# Patient Record
Sex: FEMALE | Race: OTHER | HISPANIC OR LATINO
[De-identification: names, ages, dates, MRNs, and addresses within clinical notes are randomized per-mention and may not be internally consistent; named-entity substitution may affect disease eponyms.]

---

## 2017-09-13 ENCOUNTER — RX RENEWAL (OUTPATIENT)
Age: 67
End: 2017-09-13

## 2017-12-11 ENCOUNTER — APPOINTMENT (OUTPATIENT)
Dept: NEUROLOGY | Facility: CLINIC | Age: 67
End: 2017-12-11
Payer: MEDICAID

## 2017-12-11 VITALS
SYSTOLIC BLOOD PRESSURE: 140 MMHG | DIASTOLIC BLOOD PRESSURE: 90 MMHG | HEIGHT: 60 IN | WEIGHT: 140 LBS | BODY MASS INDEX: 27.48 KG/M2

## 2017-12-11 PROCEDURE — 99214 OFFICE O/P EST MOD 30 MIN: CPT

## 2018-04-13 ENCOUNTER — MEDICATION RENEWAL (OUTPATIENT)
Age: 68
End: 2018-04-13

## 2018-08-02 ENCOUNTER — RX RENEWAL (OUTPATIENT)
Age: 68
End: 2018-08-02

## 2018-08-27 ENCOUNTER — APPOINTMENT (OUTPATIENT)
Dept: NEUROLOGY | Facility: CLINIC | Age: 68
End: 2018-08-27
Payer: MEDICAID

## 2018-08-27 VITALS
HEIGHT: 60 IN | DIASTOLIC BLOOD PRESSURE: 70 MMHG | BODY MASS INDEX: 31.41 KG/M2 | SYSTOLIC BLOOD PRESSURE: 150 MMHG | WEIGHT: 160 LBS

## 2018-08-27 PROCEDURE — 99214 OFFICE O/P EST MOD 30 MIN: CPT

## 2018-08-27 NOTE — ASSESSMENT
[FreeTextEntry1] : This is a 67-year-old woman with chronic headaches that have improved with nortriptyline.\par \par I will continue 10 mg at bedtime.\par \par She also has some mild cognitive impairment likely representing early Alzheimer's type dementia. I will continue Namenda at a dosage of 21 mg daily.\par \par Her blood pressure was slightly high today. I advised her to follow up with you for this.\par \par I will see her back in 6 months.

## 2018-08-27 NOTE — DATA REVIEWED
[de-identified] : Brain MRI was performed on 2/23/13. Study demonstrated only ischemic white matter change of a chronic nature. [de-identified] : EEG was normal. [de-identified] : B12 level was normal, thyroid function was normal, RPR was nonreactive.\par Sedimentation rate was 17.

## 2018-08-27 NOTE — CONSULT LETTER
[Dear  ___] : Dear  [unfilled], [Courtesy Letter:] : I had the pleasure of seeing your patient, [unfilled], in my office today. [Please see my note below.] : Please see my note below. [Consult Closing:] : Thank you very much for allowing me to participate in the care of this patient.  If you have any questions, please do not hesitate to contact me. [Sincerely,] : Sincerely, [FreeTextEntry3] : Richard Alexander MD.

## 2018-08-27 NOTE — PHYSICAL EXAM
[General Appearance - Alert] : alert [Oriented To Time, Place, And Person] : oriented to person, place, and time [Recall ___ / 3] : recall [unfilled] / 3 [Cranial Nerves Optic (II)] : visual acuity intact bilaterally,  visual fields full to confrontation, pupils equal round and reactive to light [Cranial Nerves Oculomotor (III)] : extraocular motion intact [Cranial Nerves Trigeminal (V)] : facial sensation intact symmetrically [Cranial Nerves Facial (VII)] : face symmetrical [Cranial Nerves Vestibulocochlear (VIII)] : hearing was intact bilaterally [Cranial Nerves Glossopharyngeal (IX)] : tongue and palate midline [Cranial Nerves Accessory (XI - Cranial And Spinal)] : head turning and shoulder shrug symmetric [Cranial Nerves Hypoglossal (XII)] : there was no tongue deviation with protrusion [Motor Tone] : muscle tone was normal in all four extremities [Motor Strength] : muscle strength was normal in all four extremities [Sensation Tactile Decrease] : light touch was intact [Sensation Vibration Decrease] : vibration was intact [Abnormal Walk] : normal gait [2+] : Patella left 2+ [FreeTextEntry1] : Short-term memory is impaired. Remote memory is relatively well preserved. [Aphasia] : no dysphasia/aphasia [Romberg's Sign] : Romberg's sign was negtive [Coordination - Dysmetria Impaired Finger-to-Nose Bilateral] : not present [Plantar Reflex Right Only] : normal on the right [Plantar Reflex Left Only] : normal on the left

## 2018-08-27 NOTE — HISTORY OF PRESENT ILLNESS
[FreeTextEntry1] : I saw this patient in the office today.\par \par As you recall she is a history of chronic headaches for many years. I had started her on nortriptyline. She had been on 10 mg as she had side effects on higher doses.\par Since starting nortriptyline she reports only occasional headaches.\par These are mild in intensity.\par They are not associated with nausea or photophobia.\par \par She also has some cognitive impairment likely representing early Alzheimer's type dementia. She had side effects on Aricept (palpitations). She has been on Namenda extended release 21 mg. She had side effects of 28 mg.\par \par

## 2018-12-26 ENCOUNTER — APPOINTMENT (OUTPATIENT)
Dept: NEUROLOGY | Facility: CLINIC | Age: 68
End: 2018-12-26
Payer: MEDICAID

## 2018-12-26 VITALS
HEIGHT: 60 IN | BODY MASS INDEX: 33.96 KG/M2 | DIASTOLIC BLOOD PRESSURE: 80 MMHG | SYSTOLIC BLOOD PRESSURE: 160 MMHG | WEIGHT: 173 LBS

## 2018-12-26 PROCEDURE — 99214 OFFICE O/P EST MOD 30 MIN: CPT

## 2018-12-26 NOTE — DATA REVIEWED
[de-identified] : Brain MRI was performed on 2/23/13. \par The study demonstrated only ischemic white matter change of a chronic nature. [de-identified] : EEG was normal. [de-identified] : B12 level was normal, thyroid function was normal, RPR was nonreactive.\par Sedimentation rate was 17.

## 2018-12-26 NOTE — ASSESSMENT
[FreeTextEntry1] : This is a 68-year-old woman with chronic headaches that have improved with nortriptyline.\par \par I will continue 10 mg at bedtime.\par \par She also has some mild cognitive impairment likely representing early Alzheimer's type dementia. I will continue Namenda at a dosage of 21 mg daily.\par \par She has a recent onset of tremors. I will obtain blood test for thyroid function.\par I will obtain a dopamine spectroscopy scan.\par \par I will see her back in 6 weeks.

## 2018-12-26 NOTE — PHYSICAL EXAM
[General Appearance - Alert] : alert [General Appearance - In No Acute Distress] : in no acute distress [Oriented To Time, Place, And Person] : oriented to person, place, and time [Recall ___ / 3] : recall [unfilled] / 3 [Cranial Nerves Optic (II)] : visual acuity intact bilaterally,  visual fields full to confrontation, pupils equal round and reactive to light [Cranial Nerves Oculomotor (III)] : extraocular motion intact [Cranial Nerves Trigeminal (V)] : facial sensation intact symmetrically [Cranial Nerves Facial (VII)] : face symmetrical [Cranial Nerves Vestibulocochlear (VIII)] : hearing was intact bilaterally [Cranial Nerves Glossopharyngeal (IX)] : tongue and palate midline [Cranial Nerves Accessory (XI - Cranial And Spinal)] : head turning and shoulder shrug symmetric [Cranial Nerves Hypoglossal (XII)] : there was no tongue deviation with protrusion [Motor Tone] : muscle tone was normal in all four extremities [Motor Strength] : muscle strength was normal in all four extremities [Sensation Tactile Decrease] : light touch was intact [Sensation Vibration Decrease] : vibration was intact [Abnormal Walk] : normal gait [Tremor] : a tremor present [2+] : Patella left 2+ [Optic Disc Abnormality] : the optic disc were normal in size and color [Edema] : there was no peripheral edema [Musculoskeletal - Swelling] : no joint swelling seen [FreeTextEntry1] : Short-term memory is impaired. Remote memory is relatively well preserved. [Dysarthria] : no dysarthria [Aphasia] : no dysphasia/aphasia [Romberg's Sign] : Romberg's sign was negtive [Coordination - Dysmetria Impaired Finger-to-Nose Bilateral] : not present [Plantar Reflex Right Only] : normal on the right [Plantar Reflex Left Only] : normal on the left

## 2018-12-26 NOTE — HISTORY OF PRESENT ILLNESS
[FreeTextEntry1] : I saw this patient in the office today.\par \par As you recall she is a history of chronic headaches for many years. I had started her on nortriptyline. She had been on 10 mg as she had side effects on higher doses.\par Since starting nortriptyline she reports only occasional headaches.\par These are mild in intensity.\par They are not associated with nausea or photophobia.\par \par She also has some cognitive impairment likely representing early Alzheimer's type dementia. \par She had side effects on Aricept (palpitations). She has been on Namenda extended release 21 mg. She had side effects of 28 mg.\par \par She now reports that for the past few months she has been having tremors of her head.\par This is with her on a daily basis.\par It waxes and wanes in intensity.\par She denies associated balance difficulty.

## 2019-01-28 ENCOUNTER — FORM ENCOUNTER (OUTPATIENT)
Age: 69
End: 2019-01-28

## 2019-01-29 ENCOUNTER — OUTPATIENT (OUTPATIENT)
Dept: OUTPATIENT SERVICES | Facility: HOSPITAL | Age: 69
LOS: 1 days | End: 2019-01-29

## 2019-01-29 ENCOUNTER — APPOINTMENT (OUTPATIENT)
Dept: NUCLEAR MEDICINE | Facility: CLINIC | Age: 69
End: 2019-01-29
Payer: MEDICAID

## 2019-01-29 DIAGNOSIS — R25.1 TREMOR, UNSPECIFIED: ICD-10-CM

## 2019-01-29 DIAGNOSIS — Z90.49 ACQUIRED ABSENCE OF OTHER SPECIFIED PARTS OF DIGESTIVE TRACT: Chronic | ICD-10-CM

## 2019-01-29 PROCEDURE — 78607: CPT | Mod: 26

## 2019-02-13 ENCOUNTER — APPOINTMENT (OUTPATIENT)
Dept: NEUROLOGY | Facility: CLINIC | Age: 69
End: 2019-02-13
Payer: MEDICAID

## 2019-02-13 VITALS
BODY MASS INDEX: 33.38 KG/M2 | SYSTOLIC BLOOD PRESSURE: 125 MMHG | WEIGHT: 170 LBS | DIASTOLIC BLOOD PRESSURE: 80 MMHG | HEIGHT: 60 IN

## 2019-02-13 PROCEDURE — 99214 OFFICE O/P EST MOD 30 MIN: CPT

## 2019-02-13 RX ORDER — ERGOCALCIFEROL 1.25 MG/1
1.25 MG CAPSULE, LIQUID FILLED ORAL
Qty: 4 | Refills: 0 | Status: COMPLETED | COMMUNITY
Start: 2018-12-19

## 2019-02-13 RX ORDER — ASPIRIN ENTERIC COATED TABLETS 81 MG 81 MG/1
81 TABLET, DELAYED RELEASE ORAL
Qty: 30 | Refills: 0 | Status: COMPLETED | COMMUNITY
Start: 2018-12-19

## 2019-02-13 NOTE — HISTORY OF PRESENT ILLNESS
[FreeTextEntry1] : I saw this patient in the office today.\par \par As you recall she is a history of chronic headaches for many years. I had started her on nortriptyline. She had been on 10 mg as she had side effects on higher doses.\par Since starting nortriptyline she reports only occasional headaches.\par These are mild in intensity.\par They are not associated with nausea or photophobia.\par \par She also has some cognitive impairment likely representing early Alzheimer's type dementia. \par She had side effects on Aricept (palpitations). She has been on Namenda extended release 21 mg. She had side effects of 28 mg.\par \par She now reports that for the past few months she has been having tremors of her head.\par This is with her on a daily basis.\par It waxes and wanes in intensity.\par She denies associated balance difficulty.\par \par

## 2019-02-13 NOTE — DATA REVIEWED
[de-identified] : Brain MRI was performed on 2/23/13. \par The study demonstrated only ischemic white matter change of a chronic nature. [de-identified] : EEG was normal. [de-identified] : Katya scan was normal.  [de-identified] : B12 level was normal, RPR was nonreactive.\par Sedimentation rate was 17.\par Thyroid function was normal.

## 2019-02-13 NOTE — ASSESSMENT
[FreeTextEntry1] : This is a 68 year-old woman with chronic headaches that have improved with nortriptyline.\par \par I will continue 10 mg at bedtime.\par \par She also has some mild cognitive impairment likely representing early Alzheimer's type dementia. I will continue Namenda at a dosage of 21 mg daily.\par \par She has a recent onset of tremors. \par She does not have findings of parkinsonism, and dopamine spectroscopy scan was normal.\par I will refer her to a movement specialist.\par \par I will see her back in 3 months.

## 2019-05-15 ENCOUNTER — APPOINTMENT (OUTPATIENT)
Dept: NEUROLOGY | Facility: CLINIC | Age: 69
End: 2019-05-15
Payer: MEDICAID

## 2019-05-15 VITALS
SYSTOLIC BLOOD PRESSURE: 160 MMHG | DIASTOLIC BLOOD PRESSURE: 80 MMHG | HEIGHT: 60 IN | WEIGHT: 170 LBS | BODY MASS INDEX: 33.38 KG/M2

## 2019-05-15 PROCEDURE — 99214 OFFICE O/P EST MOD 30 MIN: CPT

## 2019-05-15 NOTE — DATA REVIEWED
[de-identified] : Brain MRI was performed on 2/23/13. \par The study demonstrated only ischemic white matter change of a chronic nature. [de-identified] : EEG was normal. [de-identified] : Katya scan was normal.  [de-identified] : B12 level was normal, RPR was nonreactive.\par Sedimentation rate was 17.\par Thyroid function was normal.

## 2019-05-15 NOTE — ASSESSMENT
[FreeTextEntry1] : This is a 68 year-old woman with chronic headaches that have improved with nortriptyline.\par The headaches have increased again.\par I discussed the options of increasing nortriptyline as she has now been on the 10 mg dosage for quite a while and has likely acclimated.\par The alternative would be to switch her to another agent.\par Topiramate can often cause cognitive impairment and I would prefer to avoid this in this patient.\par I will increase nortriptyline to 25 mg at bedtime.\par \par She also has some mild cognitive impairment likely representing early Alzheimer's type dementia. I will continue Namenda at a dosage of 21 mg daily.\par \par She had a recent onset of tremors. \par These were not visible today.\par She does not have findings of parkinsonism, and dopamine spectroscopy scan was normal.\par I had referred her to a movement specialist.\par \par I will see her back in 3 months.

## 2019-05-15 NOTE — PHYSICAL EXAM
[General Appearance - Alert] : alert [Oriented To Time, Place, And Person] : oriented to person, place, and time [General Appearance - In No Acute Distress] : in no acute distress [Recall ___ / 3] : recall [unfilled] / 3 [Cranial Nerves Optic (II)] : visual acuity intact bilaterally,  visual fields full to confrontation, pupils equal round and reactive to light [Cranial Nerves Oculomotor (III)] : extraocular motion intact [Cranial Nerves Facial (VII)] : face symmetrical [Cranial Nerves Trigeminal (V)] : facial sensation intact symmetrically [Cranial Nerves Vestibulocochlear (VIII)] : hearing was intact bilaterally [Cranial Nerves Glossopharyngeal (IX)] : tongue and palate midline [Cranial Nerves Hypoglossal (XII)] : there was no tongue deviation with protrusion [Motor Tone] : muscle tone was normal in all four extremities [Cranial Nerves Accessory (XI - Cranial And Spinal)] : head turning and shoulder shrug symmetric [Sensation Tactile Decrease] : light touch was intact [Sensation Pain / Temperature Decrease] : pain and temperature was intact [Motor Strength] : muscle strength was normal in all four extremities [Sensation Vibration Decrease] : vibration was intact [Abnormal Walk] : normal gait [2+] : Patella left 2+ [Edema] : there was no peripheral edema [Optic Disc Abnormality] : the optic disc were normal in size and color [Musculoskeletal - Swelling] : no joint swelling seen [FreeTextEntry1] : Short-term memory is impaired. Remote memory is relatively well preserved. [Dysarthria] : no dysarthria [Aphasia] : no dysphasia/aphasia [Romberg's Sign] : Romberg's sign was negtive [Plantar Reflex Right Only] : normal on the right [Tremor] : no tremor present [Coordination - Dysmetria Impaired Finger-to-Nose Bilateral] : not present [Plantar Reflex Left Only] : normal on the left

## 2019-05-15 NOTE — HISTORY OF PRESENT ILLNESS
[FreeTextEntry1] : I saw this patient in the office today.\par \par As you recall she is a history of chronic headaches for many years. I had started her on nortriptyline. She had been on 10 mg as she had side effects on higher doses.\par She had initially reported good improvement.\par She now reports that the headaches have increased again.\par They are almost daily.\par \par She also has some cognitive impairment likely representing early Alzheimer's type dementia. \par She had side effects on Aricept (palpitations). She has been on Namenda extended release 21 mg. She had side effects of 28 mg.\par \par She now reports that for the past few months she has been having tremors of her head.\par This is with her on a daily basis.\par It waxes and wanes in intensity.\par She denies associated balance difficulty.\par \par

## 2019-05-15 NOTE — CONSULT LETTER
[Dear  ___] : Dear  [unfilled], [Please see my note below.] : Please see my note below. [Courtesy Letter:] : I had the pleasure of seeing your patient, [unfilled], in my office today. [Sincerely,] : Sincerely, [Consult Closing:] : Thank you very much for allowing me to participate in the care of this patient.  If you have any questions, please do not hesitate to contact me. [FreeTextEntry3] : Richard Alexander MD.

## 2019-05-16 ENCOUNTER — LABORATORY RESULT (OUTPATIENT)
Age: 69
End: 2019-05-16

## 2019-05-16 ENCOUNTER — APPOINTMENT (OUTPATIENT)
Dept: FAMILY MEDICINE | Facility: CLINIC | Age: 69
End: 2019-05-16
Payer: MEDICAID

## 2019-05-16 VITALS — BODY MASS INDEX: 33.77 KG/M2 | HEIGHT: 60 IN | TEMPERATURE: 98.7 F | WEIGHT: 172 LBS

## 2019-05-16 VITALS — SYSTOLIC BLOOD PRESSURE: 140 MMHG | DIASTOLIC BLOOD PRESSURE: 82 MMHG

## 2019-05-16 PROCEDURE — 99214 OFFICE O/P EST MOD 30 MIN: CPT | Mod: 25

## 2019-05-16 PROCEDURE — 36415 COLL VENOUS BLD VENIPUNCTURE: CPT

## 2019-05-16 NOTE — HISTORY OF PRESENT ILLNESS
[Family Member] : family member [FreeTextEntry1] : pt is here for meds [FreeTextEntry2] : Kenia- daughter  [de-identified] : Pt is a 68 year old Upper sorbian speaking female present with her daughter here for refill of BP medication. Pt's daughter states she did not take medication this morning due to not having any more pills. Pt also c/o L hip pain that radiates to her anterior thigh. She has had this pain for many years and when taking tylenol the pain feels better. Pt denies CP/SOB/N/V/D. Pt's daughter requests rx for mammogram for her mother. \par \par  Pt states she goes to [facility] for cardiology, is following up in 2 weeks for a stress test.

## 2019-05-16 NOTE — PHYSICAL EXAM
[No Acute Distress] : no acute distress [Normal Sclera/Conjunctiva] : normal sclera/conjunctiva [Well-Appearing] : well-appearing [EOMI] : extraocular movements intact [PERRL] : pupils equal round and reactive to light [Normal Outer Ear/Nose] : the outer ears and nose were normal in appearance [Normal Oropharynx] : the oropharynx was normal [Supple] : supple [No Lymphadenopathy] : no lymphadenopathy [Clear to Auscultation] : lungs were clear to auscultation bilaterally [No Respiratory Distress] : no respiratory distress  [Thyroid Normal, No Nodules] : the thyroid was normal and there were no nodules present [Normal Rate] : normal rate  [No Accessory Muscle Use] : no accessory muscle use [Normal S1, S2] : normal S1 and S2 [Regular Rhythm] : with a regular rhythm [No Murmur] : no murmur heard [Soft] : abdomen soft [Non Tender] : non-tender [Non-distended] : non-distended [Normal Bowel Sounds] : normal bowel sounds [Normal Posterior Cervical Nodes] : no posterior cervical lymphadenopathy [Normal Anterior Cervical Nodes] : no anterior cervical lymphadenopathy [No Spinal Tenderness] : no spinal tenderness [No Joint Swelling] : no joint swelling [Grossly Normal Strength/Tone] : grossly normal strength/tone [No Rash] : no rash [Normal Gait] : normal gait [No Focal Deficits] : no focal deficits [Coordination Grossly Intact] : coordination grossly intact [Normal Affect] : the affect was normal [de-identified] : trace edema LLE  [de-identified] : + tremor of head

## 2019-05-16 NOTE — PLAN
[FreeTextEntry1] : Assessment: HTN, L hip pain \par \par Plan:\par 1. HTN\par -Rx: Losartan/ HCTZ 100-25mg #90 with 1 refill\par -Rx: Aspirin 81mg \par -Pt to follow up with cardiology for stress test\par \par 2. Health Management  \par -RX mammogram given\par -Bloodwork drawn today \par -Call with results

## 2019-05-20 LAB
25(OH)D3 SERPL-MCNC: 29.3 NG/ML
ALBUMIN SERPL ELPH-MCNC: 4.3 G/DL
ALP BLD-CCNC: 64 U/L
ALT SERPL-CCNC: 11 U/L
ANION GAP SERPL CALC-SCNC: 12 MMOL/L
AST SERPL-CCNC: 21 U/L
BASOPHILS # BLD AUTO: 0.03 K/UL
BASOPHILS NFR BLD AUTO: 0.4 %
BILIRUB SERPL-MCNC: 0.2 MG/DL
BUN SERPL-MCNC: 20 MG/DL
CALCIUM SERPL-MCNC: 9.8 MG/DL
CHLORIDE SERPL-SCNC: 105 MMOL/L
CHOLEST SERPL-MCNC: 223 MG/DL
CHOLEST/HDLC SERPL: 3.9 RATIO
CO2 SERPL-SCNC: 26 MMOL/L
CREAT SERPL-MCNC: 0.92 MG/DL
EOSINOPHIL # BLD AUTO: 0.18 K/UL
EOSINOPHIL NFR BLD AUTO: 2.6 %
ESTIMATED AVERAGE GLUCOSE: 131 MG/DL
GLUCOSE SERPL-MCNC: 105 MG/DL
HBA1C MFR BLD HPLC: 6.2 %
HCT VFR BLD CALC: 40 %
HDLC SERPL-MCNC: 57 MG/DL
HGB BLD-MCNC: 12.4 G/DL
IMM GRANULOCYTES NFR BLD AUTO: 0.3 %
LDLC SERPL CALC-MCNC: 139 MG/DL
LYMPHOCYTES # BLD AUTO: 2.11 K/UL
LYMPHOCYTES NFR BLD AUTO: 30.8 %
MAGNESIUM SERPL-MCNC: 1.9 MG/DL
MAN DIFF?: NORMAL
MCHC RBC-ENTMCNC: 31 GM/DL
MCHC RBC-ENTMCNC: 31.6 PG
MCV RBC AUTO: 101.8 FL
MONOCYTES # BLD AUTO: 0.35 K/UL
MONOCYTES NFR BLD AUTO: 5.1 %
NEUTROPHILS # BLD AUTO: 4.17 K/UL
NEUTROPHILS NFR BLD AUTO: 60.8 %
PLATELET # BLD AUTO: 222 K/UL
POTASSIUM SERPL-SCNC: 4.6 MMOL/L
PROT SERPL-MCNC: 7.3 G/DL
RBC # BLD: 3.93 M/UL
RBC # FLD: 13.9 %
SODIUM SERPL-SCNC: 143 MMOL/L
TRIGL SERPL-MCNC: 136 MG/DL
WBC # FLD AUTO: 6.86 K/UL

## 2019-08-21 ENCOUNTER — APPOINTMENT (OUTPATIENT)
Dept: NEUROLOGY | Facility: CLINIC | Age: 69
End: 2019-08-21
Payer: MEDICAID

## 2019-08-21 VITALS
WEIGHT: 170 LBS | SYSTOLIC BLOOD PRESSURE: 138 MMHG | HEIGHT: 60 IN | BODY MASS INDEX: 33.38 KG/M2 | DIASTOLIC BLOOD PRESSURE: 80 MMHG

## 2019-08-21 PROCEDURE — 99214 OFFICE O/P EST MOD 30 MIN: CPT

## 2019-08-21 NOTE — PHYSICAL EXAM
[General Appearance - Alert] : alert [General Appearance - In No Acute Distress] : in no acute distress [Oriented To Time, Place, And Person] : oriented to person, place, and time [Recall ___ / 3] : recall [unfilled] / 3 [Cranial Nerves Optic (II)] : visual acuity intact bilaterally,  visual fields full to confrontation, pupils equal round and reactive to light [Cranial Nerves Oculomotor (III)] : extraocular motion intact [Cranial Nerves Trigeminal (V)] : facial sensation intact symmetrically [Cranial Nerves Facial (VII)] : face symmetrical [Cranial Nerves Vestibulocochlear (VIII)] : hearing was intact bilaterally [Cranial Nerves Glossopharyngeal (IX)] : tongue and palate midline [Cranial Nerves Accessory (XI - Cranial And Spinal)] : head turning and shoulder shrug symmetric [Cranial Nerves Hypoglossal (XII)] : there was no tongue deviation with protrusion [Motor Tone] : muscle tone was normal in all four extremities [Motor Strength] : muscle strength was normal in all four extremities [Sensation Tactile Decrease] : light touch was intact [Sensation Pain / Temperature Decrease] : pain and temperature was intact [Sensation Vibration Decrease] : vibration was intact [Abnormal Walk] : normal gait [2+] : Patella left 2+ [Optic Disc Abnormality] : the optic disc were normal in size and color [Edema] : there was no peripheral edema [Musculoskeletal - Swelling] : no joint swelling seen [FreeTextEntry1] : Short-term memory is impaired. Remote memory is relatively well preserved. [Dysarthria] : no dysarthria [Aphasia] : no dysphasia/aphasia [Romberg's Sign] : Romberg's sign was negtive [Tremor] : no tremor present [Coordination - Dysmetria Impaired Finger-to-Nose Bilateral] : not present [Plantar Reflex Right Only] : normal on the right [Plantar Reflex Left Only] : normal on the left

## 2019-08-21 NOTE — HISTORY OF PRESENT ILLNESS
[FreeTextEntry1] : I saw this patient in the office today.\par \par As you recall she is a history of chronic headaches for many years. I had started her on nortriptyline. \par She had initially reported good improvement.\par She had an increase in her headaches and increased nortriptyline back to 25 mg at bedtime.\par The headaches are not bother her significantly at present.\par \par She also has some cognitive impairment likely representing early Alzheimer's type dementia. \par She had side effects on Aricept (palpitations). She has been on Namenda extended release 21 mg. She had side effects of 28 mg.\par \par She has had some tremors intermittently.\par There have been no findings of parkinsonism, and dopamine spectroscopy scan was normal.\par \par \par \par

## 2019-08-21 NOTE — ASSESSMENT
[FreeTextEntry1] : This is a 68 year-old woman with chronic headaches that have improved with nortriptyline.\par The headaches have increased again.\par I discussed the options of increasing nortriptyline as she has now been on the 10 mg dosage for quite a while and has likely acclimated.\par The alternative would be to switch her to another agent.\par Topiramate can often cause cognitive impairment and I would prefer to avoid this in this patient.\par I will increase nortriptyline to 25 mg at bedtime.\par \par She also has some mild cognitive impairment likely representing early Alzheimer's type dementia. I will continue Namenda at a dosage of 21 mg daily.\par \par She had a recent onset of tremors. \par These were not visible today.\par \par I will see her back in 4 months.

## 2019-08-21 NOTE — DATA REVIEWED
[de-identified] : Brain MRI was performed on 2/23/13. \par The study demonstrated only ischemic white matter change of a chronic nature. [de-identified] : EEG was normal. [de-identified] : Katya scan was normal.  [de-identified] : B12 level was normal, RPR was nonreactive.\par Sedimentation rate was 17.\par Thyroid function was normal.

## 2019-09-17 ENCOUNTER — APPOINTMENT (OUTPATIENT)
Dept: FAMILY MEDICINE | Facility: CLINIC | Age: 69
End: 2019-09-17
Payer: MEDICAID

## 2019-09-17 VITALS
BODY MASS INDEX: 33.67 KG/M2 | OXYGEN SATURATION: 96 % | HEIGHT: 60 IN | TEMPERATURE: 98.3 F | HEART RATE: 93 BPM | WEIGHT: 171.5 LBS

## 2019-09-17 VITALS — DIASTOLIC BLOOD PRESSURE: 80 MMHG | SYSTOLIC BLOOD PRESSURE: 140 MMHG

## 2019-09-17 PROCEDURE — 99213 OFFICE O/P EST LOW 20 MIN: CPT

## 2019-09-17 RX ORDER — LOSARTAN POTASSIUM AND HYDROCHLOROTHIAZIDE 25; 100 MG/1; MG/1
100-25 TABLET ORAL DAILY
Qty: 90 | Refills: 1 | Status: DISCONTINUED | COMMUNITY
Start: 2019-05-16 | End: 2019-09-17

## 2019-09-17 NOTE — PHYSICAL EXAM
[Normal Sclera/Conjunctiva] : normal sclera/conjunctiva [Normal] : normal rate, regular rhythm, normal S1 and S2 and no murmur heard [No Edema] : there was no peripheral edema

## 2019-09-17 NOTE — HEALTH RISK ASSESSMENT
[No] : No [MammogramDate] : 05/2019 [PapSmearDate] : 01/2017 [ColonoscopyComments] : Greater than 5 years [] : No

## 2019-09-17 NOTE — HISTORY OF PRESENT ILLNESS
[FreeTextEntry1] : pt is here for med refills  [de-identified] : Needs renewal of blood pressure medication,Ran out 3 days ago

## 2019-10-15 ENCOUNTER — MED ADMIN CHARGE (OUTPATIENT)
Age: 69
End: 2019-10-15

## 2019-10-15 ENCOUNTER — APPOINTMENT (OUTPATIENT)
Dept: FAMILY MEDICINE | Facility: CLINIC | Age: 69
End: 2019-10-15
Payer: MEDICAID

## 2019-10-15 VITALS
WEIGHT: 172.5 LBS | HEART RATE: 91 BPM | SYSTOLIC BLOOD PRESSURE: 140 MMHG | BODY MASS INDEX: 33.86 KG/M2 | DIASTOLIC BLOOD PRESSURE: 78 MMHG | OXYGEN SATURATION: 98 % | HEIGHT: 60 IN

## 2019-10-15 PROCEDURE — 90471 IMMUNIZATION ADMIN: CPT

## 2019-10-15 PROCEDURE — 90662 IIV NO PRSV INCREASED AG IM: CPT

## 2019-10-15 PROCEDURE — 36415 COLL VENOUS BLD VENIPUNCTURE: CPT

## 2019-10-15 PROCEDURE — 99213 OFFICE O/P EST LOW 20 MIN: CPT | Mod: 25

## 2019-10-15 RX ORDER — HYDROCHLOROTHIAZIDE 25 MG/1
25 TABLET ORAL
Qty: 90 | Refills: 1 | Status: DISCONTINUED | COMMUNITY
Start: 2019-09-17 | End: 2019-10-15

## 2019-10-15 RX ORDER — LOSARTAN POTASSIUM 100 MG/1
100 TABLET, FILM COATED ORAL DAILY
Qty: 90 | Refills: 0 | Status: DISCONTINUED | COMMUNITY
Start: 2019-09-17 | End: 2019-10-15

## 2019-10-15 RX ORDER — ERGOCALCIFEROL 1.25 MG/1
1.25 MG CAPSULE, LIQUID FILLED ORAL
Qty: 4 | Refills: 0 | Status: DISCONTINUED | COMMUNITY
Start: 2018-12-19 | End: 2019-10-15

## 2019-10-15 RX ORDER — GLUCOSAMINE HCL 500 MG
75 MCG TABLET ORAL
Qty: 90 | Refills: 2 | Status: DISCONTINUED | COMMUNITY
Start: 2019-09-17 | End: 2019-10-15

## 2019-10-15 NOTE — PHYSICAL EXAM
[Normal Outer Ear/Nose] : the outer ears and nose were normal in appearance [Normal Sclera/Conjunctiva] : normal sclera/conjunctiva [No Focal Deficits] : no focal deficits [Normal] : affect was normal and insight and judgment were intact [Normal Supraclavicular Nodes] : no supraclavicular lymphadenopathy [Normal Posterior Cervical Nodes] : no posterior cervical lymphadenopathy [Normal Anterior Cervical Nodes] : no anterior cervical lymphadenopathy

## 2019-10-15 NOTE — HISTORY OF PRESENT ILLNESS
[FreeTextEntry1] : pt is here to f/u from last visit  [de-identified] : 68-year-old female past medical history the start of Alzheimer's, hypertension, headaches, and benign tremor. \par Needing medication renewal, and another name for gastroenterology. Prior physician that was given to her does not take her insurance\par

## 2019-10-18 LAB
BASOPHILS # BLD AUTO: 0.02 K/UL
BASOPHILS NFR BLD AUTO: 0.3 %
CHOLEST SERPL-MCNC: 198 MG/DL
CHOLEST/HDLC SERPL: 3.8 RATIO
EOSINOPHIL # BLD AUTO: 0.31 K/UL
EOSINOPHIL NFR BLD AUTO: 4.1 %
ESTIMATED AVERAGE GLUCOSE: 131 MG/DL
HBA1C MFR BLD HPLC: 6.2 %
HCT VFR BLD CALC: 40.2 %
HDLC SERPL-MCNC: 52 MG/DL
HGB BLD-MCNC: 12.3 G/DL
IMM GRANULOCYTES NFR BLD AUTO: 0.3 %
LDLC SERPL CALC-MCNC: 118 MG/DL
LYMPHOCYTES # BLD AUTO: 2.18 K/UL
LYMPHOCYTES NFR BLD AUTO: 29 %
MAN DIFF?: NORMAL
MCHC RBC-ENTMCNC: 30.4 PG
MCHC RBC-ENTMCNC: 30.6 GM/DL
MCV RBC AUTO: 99.3 FL
MONOCYTES # BLD AUTO: 0.4 K/UL
MONOCYTES NFR BLD AUTO: 5.3 %
NEUTROPHILS # BLD AUTO: 4.6 K/UL
NEUTROPHILS NFR BLD AUTO: 61 %
PLATELET # BLD AUTO: 212 K/UL
RBC # BLD: 4.05 M/UL
RBC # FLD: 13 %
TRIGL SERPL-MCNC: 141 MG/DL
WBC # FLD AUTO: 7.53 K/UL

## 2019-12-16 RX ORDER — LOSARTAN POTASSIUM AND HYDROCHLOROTHIAZIDE 25; 100 MG/1; MG/1
100-25 TABLET ORAL DAILY
Qty: 90 | Refills: 0 | Status: DISCONTINUED | COMMUNITY
Start: 2019-10-15 | End: 2019-12-16

## 2019-12-16 RX ORDER — LOSARTAN POTASSIUM AND HYDROCHLOROTHIAZIDE 25; 100 MG/1; MG/1
100-25 TABLET ORAL DAILY
Qty: 90 | Refills: 0 | Status: DISCONTINUED | COMMUNITY
End: 2019-12-16

## 2020-01-08 ENCOUNTER — APPOINTMENT (OUTPATIENT)
Dept: NEUROLOGY | Facility: CLINIC | Age: 70
End: 2020-01-08
Payer: MEDICAID

## 2020-01-08 VITALS
DIASTOLIC BLOOD PRESSURE: 82 MMHG | BODY MASS INDEX: 33.77 KG/M2 | HEIGHT: 60 IN | WEIGHT: 172 LBS | SYSTOLIC BLOOD PRESSURE: 150 MMHG

## 2020-01-08 PROCEDURE — 99214 OFFICE O/P EST MOD 30 MIN: CPT

## 2020-01-08 NOTE — DATA REVIEWED
[de-identified] : Katya scan was normal.  [de-identified] : Brain MRI was performed on 2/23/13. \par The study demonstrated only ischemic white matter change of a chronic nature. [de-identified] : EEG was normal. [de-identified] : B12 level was normal, RPR was nonreactive.\par Sedimentation rate was 17.\par Thyroid function was normal.

## 2020-01-08 NOTE — ASSESSMENT
[FreeTextEntry1] : This is a 69 year-old woman with chronic headaches that have improved with nortriptyline.\par She is currently on 25 mg at bedtime.\par \par She also has some mild cognitive impairment likely representing early Alzheimer's type dementia. I will continue Namenda at a dosage of 21 mg daily.\par \par She has been having intermittent tremors predominantly of her head.\par These were not visible today.\par Neurologic workup has been negative for this.\par \par I will see her back in 4 months.

## 2020-01-08 NOTE — PHYSICAL EXAM
[General Appearance - Alert] : alert [Oriented To Time, Place, And Person] : oriented to person, place, and time [General Appearance - In No Acute Distress] : in no acute distress [Recall ___ / 3] : recall [unfilled] / 3 [Cranial Nerves Optic (II)] : visual acuity intact bilaterally,  visual fields full to confrontation, pupils equal round and reactive to light [Cranial Nerves Trigeminal (V)] : facial sensation intact symmetrically [Cranial Nerves Oculomotor (III)] : extraocular motion intact [Cranial Nerves Facial (VII)] : face symmetrical [Cranial Nerves Glossopharyngeal (IX)] : tongue and palate midline [Cranial Nerves Vestibulocochlear (VIII)] : hearing was intact bilaterally [Cranial Nerves Accessory (XI - Cranial And Spinal)] : head turning and shoulder shrug symmetric [Cranial Nerves Hypoglossal (XII)] : there was no tongue deviation with protrusion [Motor Tone] : muscle tone was normal in all four extremities [Motor Strength] : muscle strength was normal in all four extremities [Sensation Tactile Decrease] : light touch was intact [Sensation Pain / Temperature Decrease] : pain and temperature was intact [Sensation Vibration Decrease] : vibration was intact [Abnormal Walk] : normal gait [2+] : Patella right 2+ [Optic Disc Abnormality] : the optic disc were normal in size and color [Involuntary Movements] : no involuntary movements were seen [Edema] : there was no peripheral edema [FreeTextEntry1] : Short-term memory is impaired. Remote memory is relatively well preserved. [Dysarthria] : no dysarthria [Aphasia] : no dysphasia/aphasia [Romberg's Sign] : Romberg's sign was negtive [Coordination - Dysmetria Impaired Finger-to-Nose Bilateral] : not present [Tremor] : no tremor present [Plantar Reflex Right Only] : normal on the right [Plantar Reflex Left Only] : normal on the left

## 2020-03-12 ENCOUNTER — RX RENEWAL (OUTPATIENT)
Age: 70
End: 2020-03-12

## 2020-05-11 ENCOUNTER — APPOINTMENT (OUTPATIENT)
Dept: NEUROLOGY | Facility: CLINIC | Age: 70
End: 2020-05-11

## 2020-06-09 ENCOUNTER — APPOINTMENT (OUTPATIENT)
Dept: FAMILY MEDICINE | Facility: CLINIC | Age: 70
End: 2020-06-09
Payer: MEDICAID

## 2020-06-09 VITALS
RESPIRATION RATE: 14 BRPM | TEMPERATURE: 97.9 F | BODY MASS INDEX: 32.03 KG/M2 | WEIGHT: 164 LBS | HEART RATE: 96 BPM | OXYGEN SATURATION: 98 %

## 2020-06-09 VITALS — SYSTOLIC BLOOD PRESSURE: 122 MMHG | DIASTOLIC BLOOD PRESSURE: 74 MMHG

## 2020-06-09 PROCEDURE — 99214 OFFICE O/P EST MOD 30 MIN: CPT | Mod: 25

## 2020-06-09 PROCEDURE — 36415 COLL VENOUS BLD VENIPUNCTURE: CPT

## 2020-06-09 RX ORDER — MELOXICAM 15 MG/1
15 TABLET ORAL
Qty: 30 | Refills: 0 | Status: COMPLETED | COMMUNITY
Start: 2018-12-12 | End: 2020-06-09

## 2020-06-09 RX ORDER — ASPIRIN 81 MG/1
81 TABLET ORAL DAILY
Qty: 90 | Refills: 1 | Status: DISCONTINUED | COMMUNITY
Start: 2019-05-16 | End: 2020-06-09

## 2020-06-09 NOTE — COUNSELING
[Sleep ___ hours/day] : Sleep [unfilled] hours/day [Behavioral health counseling provided] : Behavioral health counseling provided [Engage in a relaxing activity] : Engage in a relaxing activity [Plan in advance] : Plan in advance [de-identified] : Maintain balanced diet of whole grain, fruits and vegetables, lean meats, skinless poultry, fish, beans, soy products, eggs, nuts, and low fat dairy as per FDA dietary guidelines. \par Avoid high calorie/low nutrient foods. \par vegetables/fruits- at least 5 servings/day, \par whole grains- 100% whole grain and at least 3 grams fiber/day.\par reduce/eliminate saturated fat- less than 5% on nutrition labels (ex. stone, deli meat, hot dogs, fried foods, cookies, ice cream, chips, soft drinks, etc.)\par stay within your FDA recommended daily calorie needs or use the plate method to portion intake. \par Avoid fast food and limit eating out. When eating out choose healthy alternatives (ex. grilled, baked, steamed. low fat dressings. avoid french fries).\par DO NOT CONSUME FOODS YOU ARE ALLERGIC TO DESPITE DIETARY RECOMMENDATIONS.\par \par For more information you can visit [email or web address] \par \par Incorporate regular physical activity most days of the week. \par Regular aerobic activity- moderate intensity, most days/wk, at least 30min/day- ex. brisk walk 2.5miles/hr, ballroom dancing, water aerobics, light yard work (raking/lawn mowing) actively playing basketball, biking 10miles/hr.\par Muscle strengthening and strength/resistance exercises 2-3days/wk- ex. free weights, resistance bands, your own weight (squats/pull-ups/push-ups).\par Neuro-motor exercises for balance/agility/coordination and flexibility exercises 2-3days/wk, 20-30min/day- ex. yoga and joseph-chi.\par Bone strengthening at least 30min/day, most days of the week- ex. weights, resistance bands, running, brisk walking, jumping rope, stair climbing, tennis.\par Decrease sedentary time. \par LISTEN TO YOUR BODY AND MODIFY ACTIVITY AS NEEDED- DO NOT OVEREXERT YOURSELF DURING PHYSICAL ACTIVITY DESPITE RECOMMENDATION.\par \par For more information you can visit [email or web address] \par \par PATIENT REFUSED  PHONE R/T CONFUSION WITH USE, PREFERS DAUGHTER FOR TRANSLATION.  [None] : None [Good understanding] : Patient has a good understanding of lifestyle changes and steps needed to achieve self management goal

## 2020-06-09 NOTE — HISTORY OF PRESENT ILLNESS
[FreeTextEntry1] : here for f/u visit and med refill [de-identified] : This is a 68y/o [place]ian speaking female here today for f/u visit with medication refills and repeat blood work.\par PATIENT REFUSED  PHONE R/T CONFUSION WITH USE, PREFERS DAUGHTER FOR TRANSLATION. \par PMH includes HTN and hyperlipidemia; patient follows up with cardiology Dr. Dumont, cardiology provided renewals for Amlodipine, patient requests refills for daily Asa, Losartan, and Hydrochlorothiazide as patient reports cardiology does not provide refills for these medications. NP agrees to provide and cardiology to resume for future refill as appropriate. patient reports next f/u visit with cardiologist is July 2020 for stress/echo. o/w denies concerns/complaints. \par h/o Vitamin D deficiency taking daily supplement; requesting refill of vitamin D3. denies concerns. \par other PMH includes Alzheimer dementia; follows up with neurologist Dr. Alexander who provides renewals for Namenda and Nortriptyline. denies concerns. \par patient also requesting referral for colonoscopy as patient has not completed screening. \par patient also requesting screening for COVID19 antibody; denies h/o illness or known sick contacts for COVID19.\par o/w no major concerns; denies other major accidents, injuries, illnesses, hospitalizations in the last year.

## 2020-06-09 NOTE — HEALTH RISK ASSESSMENT
[No falls in past year] : Patient reported no falls in the past year [No] : In the past 12 months have you used drugs other than those required for medical reasons? No [0] : 2) Feeling down, depressed, or hopeless: Not at all (0) [] : No [[id number]] : 0

## 2020-06-09 NOTE — PHYSICAL EXAM
[No Acute Distress] : no acute distress [Well Nourished] : well nourished [Well Developed] : well developed [Normal Sclera/Conjunctiva] : normal sclera/conjunctiva [Normal Outer Ear/Nose] : the outer ears and nose were normal in appearance [PERRL] : pupils equal round and reactive to light [Normal TMs] : both tympanic membranes were normal [Normal Oropharynx] : the oropharynx was normal [No Lymphadenopathy] : no lymphadenopathy [No Respiratory Distress] : no respiratory distress  [Clear to Auscultation] : lungs were clear to auscultation bilaterally [No Accessory Muscle Use] : no accessory muscle use [Normal] : normal rate, regular rhythm, normal S1 and S2 and no murmur heard [No Edema] : there was no peripheral edema [Soft] : abdomen soft [Non Tender] : non-tender [Declined Rectal Exam] : declined rectal exam [No Rash] : no rash [Coordination Grossly Intact] : coordination grossly intact [Normal Gait] : normal gait [Normal Affect] : the affect was normal [FreeTextEntry1] : referred for colonoscopy [Normal Insight/Judgement] : insight and judgment were intact

## 2020-06-09 NOTE — HISTORY OF PRESENT ILLNESS
[FreeTextEntry1] : here for f/u visit and med refill [de-identified] : This is a 68y/o Mosotho speaking female here today for f/u visit with medication refills and repeat blood work.\par PATIENT REFUSED  PHONE R/T CONFUSION WITH USE, PREFERS DAUGHTER FOR TRANSLATION. \par PMH includes HTN and hyperlipidemia; patient follows up with cardiology Dr. Dumont, cardiology provided renewals for Amlodipine, patient requests refills for daily Asa, Losartan, and Hydrochlorothiazide as patient reports cardiology does not provide refills for these medications. NP agrees to provide and cardiology to resume for future refill as appropriate. patient reports next f/u visit with cardiologist is July 2020 for stress/echo. o/w denies concerns/complaints. \par h/o Vitamin D deficiency taking daily supplement; requesting refill of vitamin D3. denies concerns. \par other PMH includes Alzheimer dementia; follows up with neurologist Dr. Alexander who provides renewals for Namenda and Nortriptyline. denies concerns. \par patient also requesting referral for colonoscopy as patient has not completed screening. \par patient also requesting screening for COVID19 antibody; denies h/o illness or known sick contacts for COVID19.\par o/w no major concerns; denies other major accidents, injuries, illnesses, hospitalizations in the last year.

## 2020-06-09 NOTE — COUNSELING
[Behavioral health counseling provided] : Behavioral health counseling provided [Sleep ___ hours/day] : Sleep [unfilled] hours/day [Engage in a relaxing activity] : Engage in a relaxing activity [Plan in advance] : Plan in advance [None] : None [de-identified] : Maintain balanced diet of whole grain, fruits and vegetables, lean meats, skinless poultry, fish, beans, soy products, eggs, nuts, and low fat dairy as per FDA dietary guidelines. \par Avoid high calorie/low nutrient foods. \par vegetables/fruits- at least 5 servings/day, \par whole grains- 100% whole grain and at least 3 grams fiber/day.\par reduce/eliminate saturated fat- less than 5% on nutrition labels (ex. stone, deli meat, hot dogs, fried foods, cookies, ice cream, chips, soft drinks, etc.)\par stay within your FDA recommended daily calorie needs or use the plate method to portion intake. \par Avoid fast food and limit eating out. When eating out choose healthy alternatives (ex. grilled, baked, steamed. low fat dressings. avoid french fries).\par DO NOT CONSUME FOODS YOU ARE ALLERGIC TO DESPITE DIETARY RECOMMENDATIONS.\par \par For more information you can visit [email or web address] \par \par Incorporate regular physical activity most days of the week. \par Regular aerobic activity- moderate intensity, most days/wk, at least 30min/day- ex. brisk walk 2.5miles/hr, ballroom dancing, water aerobics, light yard work (raking/lawn mowing) actively playing basketball, biking 10miles/hr.\par Muscle strengthening and strength/resistance exercises 2-3days/wk- ex. free weights, resistance bands, your own weight (squats/pull-ups/push-ups).\par Neuro-motor exercises for balance/agility/coordination and flexibility exercises 2-3days/wk, 20-30min/day- ex. yoga and joseph-chi.\par Bone strengthening at least 30min/day, most days of the week- ex. weights, resistance bands, running, brisk walking, jumping rope, stair climbing, tennis.\par Decrease sedentary time. \par LISTEN TO YOUR BODY AND MODIFY ACTIVITY AS NEEDED- DO NOT OVEREXERT YOURSELF DURING PHYSICAL ACTIVITY DESPITE RECOMMENDATION.\par \par For more information you can visit [email or web address] \par \par PATIENT REFUSED  PHONE R/T CONFUSION WITH USE, PREFERS DAUGHTER FOR TRANSLATION.  [Good understanding] : Patient has a good understanding of lifestyle changes and steps needed to achieve self management goal

## 2020-06-09 NOTE — REVIEW OF SYSTEMS
[Skin Rash] : no skin rash [Negative] : Psychiatric [de-identified] : baseline Alzheimer dementia, not advanced

## 2020-06-09 NOTE — HEALTH RISK ASSESSMENT
[No falls in past year] : Patient reported no falls in the past year [No] : In the past 12 months have you used drugs other than those required for medical reasons? No [0] : 2) Feeling down, depressed, or hopeless: Not at all (0) [[id number]] : 0 [] : No

## 2020-06-09 NOTE — REVIEW OF SYSTEMS
[Skin Rash] : no skin rash [Negative] : Psychiatric [de-identified] : baseline Alzheimer dementia, not advanced

## 2020-06-09 NOTE — PHYSICAL EXAM
[No Acute Distress] : no acute distress [Well Nourished] : well nourished [Normal Sclera/Conjunctiva] : normal sclera/conjunctiva [Well Developed] : well developed [Normal Outer Ear/Nose] : the outer ears and nose were normal in appearance [PERRL] : pupils equal round and reactive to light [Normal TMs] : both tympanic membranes were normal [Normal Oropharynx] : the oropharynx was normal [No Lymphadenopathy] : no lymphadenopathy [No Accessory Muscle Use] : no accessory muscle use [Clear to Auscultation] : lungs were clear to auscultation bilaterally [No Respiratory Distress] : no respiratory distress  [Normal] : normal rate, regular rhythm, normal S1 and S2 and no murmur heard [No Edema] : there was no peripheral edema [Soft] : abdomen soft [Non Tender] : non-tender [Declined Rectal Exam] : declined rectal exam [No Rash] : no rash [Coordination Grossly Intact] : coordination grossly intact [Normal Gait] : normal gait [Normal Affect] : the affect was normal [FreeTextEntry1] : referred for colonoscopy [Normal Insight/Judgement] : insight and judgment were intact

## 2020-06-09 NOTE — PLAN
[FreeTextEntry1] : labs checked today- f/u results\par continue f/u with neurology, cardiology, and f/u with GI and complete colonoscopy as discussed.\par patient to have all reports sent to our office for review and record keeping.\par medications renewed as requested.\par notify office if worsening/persistent symptoms or new onset complaints/concerns, in the event of any emergency or life threatening condition, go to the nearest emergency department and then notify office. \par patient verbalized understanding and agrees with plan of care. \par \par PATIENT REFUSED  PHONE R/T CONFUSION WITH USE, PREFERS DAUGHTER FOR TRANSLATION.

## 2020-06-10 ENCOUNTER — RX RENEWAL (OUTPATIENT)
Age: 70
End: 2020-06-10

## 2020-06-11 LAB
25(OH)D3 SERPL-MCNC: 47.8 NG/ML
ALBUMIN SERPL ELPH-MCNC: 4 G/DL
ALP BLD-CCNC: 63 U/L
ALT SERPL-CCNC: 13 U/L
ANION GAP SERPL CALC-SCNC: 14 MMOL/L
AST SERPL-CCNC: 26 U/L
BILIRUB SERPL-MCNC: 0.2 MG/DL
BUN SERPL-MCNC: 18 MG/DL
CALCIUM SERPL-MCNC: 10.1 MG/DL
CHLORIDE SERPL-SCNC: 101 MMOL/L
CHOLEST SERPL-MCNC: 234 MG/DL
CHOLEST/HDLC SERPL: 3.8 RATIO
CO2 SERPL-SCNC: 22 MMOL/L
CREAT SERPL-MCNC: 0.85 MG/DL
ESTIMATED AVERAGE GLUCOSE: 134 MG/DL
GLUCOSE SERPL-MCNC: 105 MG/DL
HBA1C MFR BLD HPLC: 6.3 %
HDLC SERPL-MCNC: 61 MG/DL
LDLC SERPL CALC-MCNC: 144 MG/DL
POTASSIUM SERPL-SCNC: 4 MMOL/L
PROT SERPL-MCNC: 7.2 G/DL
SARS-COV-2 IGG SERPL IA-ACNC: 4.8 INDEX
SARS-COV-2 IGG SERPL QL IA: POSITIVE
SODIUM SERPL-SCNC: 137 MMOL/L
TRIGL SERPL-MCNC: 140 MG/DL

## 2020-06-16 ENCOUNTER — APPOINTMENT (OUTPATIENT)
Dept: FAMILY MEDICINE | Facility: CLINIC | Age: 70
End: 2020-06-16
Payer: MEDICAID

## 2020-06-16 VITALS
DIASTOLIC BLOOD PRESSURE: 60 MMHG | WEIGHT: 164 LBS | BODY MASS INDEX: 32.2 KG/M2 | TEMPERATURE: 97.9 F | HEIGHT: 60 IN | SYSTOLIC BLOOD PRESSURE: 140 MMHG

## 2020-06-16 VITALS — SYSTOLIC BLOOD PRESSURE: 130 MMHG | DIASTOLIC BLOOD PRESSURE: 76 MMHG

## 2020-06-16 VITALS — HEART RATE: 85 BPM

## 2020-06-16 DIAGNOSIS — E55.9 VITAMIN D DEFICIENCY, UNSPECIFIED: ICD-10-CM

## 2020-06-16 DIAGNOSIS — Z11.59 ENCOUNTER FOR SCREENING FOR OTHER VIRAL DISEASES: ICD-10-CM

## 2020-06-16 PROCEDURE — 99213 OFFICE O/P EST LOW 20 MIN: CPT

## 2020-06-16 NOTE — COUNSELING
[Behavioral health counseling provided] : Behavioral health counseling provided [Sleep ___ hours/day] : Sleep [unfilled] hours/day [Engage in a relaxing activity] : Engage in a relaxing activity [Plan in advance] : Plan in advance [None] : None [Good understanding] : Patient has a good understanding of lifestyle changes and steps needed to achieve self management goal [de-identified] : Maintain balanced diet of whole grain, fruits and vegetables, lean meats, skinless poultry, fish, beans, soy products, eggs, nuts, and low fat dairy as per FDA dietary guidelines. \par Avoid high calorie/low nutrient foods. \par vegetables/fruits- at least 5 servings/day, \par whole grains- 100% whole grain and at least 3 grams fiber/day.\par reduce/eliminate saturated fat- less than 5% on nutrition labels (ex. stone, deli meat, hot dogs, fried foods, cookies, ice cream, chips, soft drinks, etc.)\par stay within your FDA recommended daily calorie needs or use the plate method to portion intake. \par Avoid fast food and limit eating out. When eating out choose healthy alternatives (ex. grilled, baked, steamed. low fat dressings. avoid french fries).\par DO NOT CONSUME FOODS YOU ARE ALLERGIC TO DESPITE DIETARY RECOMMENDATIONS.\par \par For more information you can visit [email or web address] \par \par Incorporate regular physical activity most days of the week. \par Regular aerobic activity- moderate intensity, most days/wk, at least 30min/day- ex. brisk walk 2.5miles/hr, ballroom dancing, water aerobics, light yard work (raking/lawn mowing) actively playing basketball, biking 10miles/hr.\par Muscle strengthening and strength/resistance exercises 2-3days/wk- ex. free weights, resistance bands, your own weight (squats/pull-ups/push-ups).\par Neuro-motor exercises for balance/agility/coordination and flexibility exercises 2-3days/wk, 20-30min/day- ex. yoga and joseph-chi.\par Bone strengthening at least 30min/day, most days of the week- ex. weights, resistance bands, running, brisk walking, jumping rope, stair climbing, tennis.\par Decrease sedentary time. \par LISTEN TO YOUR BODY AND MODIFY ACTIVITY AS NEEDED- DO NOT OVEREXERT YOURSELF DURING PHYSICAL ACTIVITY DESPITE RECOMMENDATION.\par \par For more information you can visit [email or web address]\par \par PATIENT REFUSED  PHONE R/T CONFUSION WITH USE, Richelle Campoverde utilized for translation.\par

## 2020-06-16 NOTE — PHYSICAL EXAM
[No Acute Distress] : no acute distress [No Accessory Muscle Use] : no accessory muscle use [No Respiratory Distress] : no respiratory distress  [Well Nourished] : well nourished [Clear to Auscultation] : lungs were clear to auscultation bilaterally [Normal] : normal rate, regular rhythm, normal S1 and S2 and no murmur heard [Coordination Grossly Intact] : coordination grossly intact [Normal Gait] : normal gait [Normal Affect] : the affect was normal [Normal Insight/Judgement] : insight and judgment were intact

## 2020-06-16 NOTE — HISTORY OF PRESENT ILLNESS
[Family Member] : family member [FreeTextEntry1] : review lab results [de-identified] : This is a 68y/o [place] citizen speaking female here today to review lab results. \par patient [id number] c/w prediabetes and lipid panel with worsening LDL and total cholesterol 15% ASCVD risk value; encouraged life style modifications and start Statin therapy.\par COVID19 antibody positive; encouraged patient to c/w infection prevention practices and social distancing as recommended. \par o/w no major concerns.\par \par PATIENT REFUSED  PHONE R/T CONFUSION WITH USE, Richelle Campoverde utilized for translation.

## 2020-06-16 NOTE — PLAN
[FreeTextEntry1] : Start Simvastatin as prescribed for management of hyperlipidemia. notify our office if any s/s side effects or adverse reaction. for serious/potentially life threatening reactions- go to nearest emergency department and then notify our office.\par continue with plans for f/u with neurology, cardiology, and f/u with GI and complete colonoscopy as discussed.\par patient to have all reports sent to our office for review and record keeping.\par notify office if worsening/persistent symptoms or new onset complaints/concerns, in the event of any emergency or life threatening condition, go to the nearest emergency department and then notify office. \par patient verbalized understanding and agrees with plan of care. \par \par PATIENT REFUSED  PHONE R/T CONFUSION WITH USE, Richelle Campoverde utilized for translation.

## 2020-06-30 ENCOUNTER — TRANSCRIPTION ENCOUNTER (OUTPATIENT)
Age: 70
End: 2020-06-30

## 2020-07-14 ENCOUNTER — RX RENEWAL (OUTPATIENT)
Age: 70
End: 2020-07-14

## 2020-08-12 ENCOUNTER — RX RENEWAL (OUTPATIENT)
Age: 70
End: 2020-08-12

## 2020-09-08 ENCOUNTER — RX RENEWAL (OUTPATIENT)
Age: 70
End: 2020-09-08

## 2020-09-09 ENCOUNTER — RX CHANGE (OUTPATIENT)
Age: 70
End: 2020-09-09

## 2020-09-10 ENCOUNTER — RX CHANGE (OUTPATIENT)
Age: 70
End: 2020-09-10

## 2020-09-11 ENCOUNTER — RX CHANGE (OUTPATIENT)
Age: 70
End: 2020-09-11

## 2020-10-09 ENCOUNTER — RX RENEWAL (OUTPATIENT)
Age: 70
End: 2020-10-09

## 2020-10-12 ENCOUNTER — RX CHANGE (OUTPATIENT)
Age: 70
End: 2020-10-12

## 2020-10-12 RX ORDER — MEMANTINE HYDROCHLORIDE 21 MG/1
21 CAPSULE, EXTENDED RELEASE ORAL
Qty: 30 | Refills: 5 | Status: DISCONTINUED | COMMUNITY
Start: 2017-09-13 | End: 2020-10-12

## 2020-10-13 ENCOUNTER — RX CHANGE (OUTPATIENT)
Age: 70
End: 2020-10-13

## 2020-10-13 RX ORDER — MEMANTINE HYDROCHLORIDE 21 MG/1
21 CAPSULE, EXTENDED RELEASE ORAL
Qty: 30 | Refills: 5 | Status: DISCONTINUED | COMMUNITY
Start: 2020-10-12 | End: 2020-10-13

## 2020-10-20 ENCOUNTER — APPOINTMENT (OUTPATIENT)
Dept: FAMILY MEDICINE | Facility: CLINIC | Age: 70
End: 2020-10-20
Payer: MEDICAID

## 2020-10-20 VITALS — SYSTOLIC BLOOD PRESSURE: 130 MMHG | RESPIRATION RATE: 14 BRPM | DIASTOLIC BLOOD PRESSURE: 78 MMHG

## 2020-10-20 VITALS — TEMPERATURE: 98.5 F | OXYGEN SATURATION: 98 % | HEART RATE: 95 BPM

## 2020-10-20 PROCEDURE — 99214 OFFICE O/P EST MOD 30 MIN: CPT | Mod: 25

## 2020-10-20 PROCEDURE — 99072 ADDL SUPL MATRL&STAF TM PHE: CPT

## 2020-10-20 PROCEDURE — 36415 COLL VENOUS BLD VENIPUNCTURE: CPT

## 2020-10-20 PROCEDURE — 90662 IIV NO PRSV INCREASED AG IM: CPT

## 2020-10-20 PROCEDURE — G0008: CPT

## 2020-10-20 RX ORDER — AMLODIPINE BESYLATE 2.5 MG/1
2.5 TABLET ORAL
Refills: 0 | Status: DISCONTINUED | COMMUNITY
End: 2020-10-20

## 2020-10-20 NOTE — REVIEW OF SYSTEMS
[Confusion] : confusion [Negative] : Heme/Lymph [Skin Rash] : no skin rash [de-identified] : at baseline a/w advancing dementia

## 2020-10-20 NOTE — HEALTH RISK ASSESSMENT
[No] : In the past 12 months have you used drugs other than those required for medical reasons? No [No falls in past year] : Patient reported no falls in the past year [0] : 2) Feeling down, depressed, or hopeless: Not at all (0) [] : No [de-identified] : limited a/w dementia [de-identified] : well balanced [[id number]] : 0

## 2020-10-20 NOTE — COUNSELING
[Fall prevention counseling provided] : Fall prevention counseling provided [No throw rugs] : No throw rugs [Adequate lighting] : Adequate lighting [Use proper foot wear] : Use proper foot wear [Use recommended devices] : Use recommended devices [Behavioral health counseling provided] : Behavioral health counseling provided [Sleep ___ hours/day] : Sleep [unfilled] hours/day [Engage in a relaxing activity] : Engage in a relaxing activity [Plan in advance] : Plan in advance [None] : None [Good understanding] : Patient has a good understanding of lifestyle changes and steps needed to achieve self management goal [de-identified] : Maintain balanced diet of whole grain, fruits and vegetables, lean meats, skinless poultry, fish, beans, soy products, eggs, nuts, and low fat dairy as per FDA dietary guidelines. \par Avoid high calorie/low nutrient foods. \par vegetables/fruits- at least 5 servings/day, \par whole grains- 100% whole grain and at least 3 grams fiber/day.\par reduce/eliminate saturated fat- less than 5% on nutrition labels (ex. stone, deli meat, hot dogs, fried foods, cookies, ice cream, chips, soft drinks, etc.)\par stay within your FDA recommended daily calorie needs or use the plate method to portion intake. \par Avoid fast food and limit eating out. When eating out choose healthy alternatives (ex. grilled, baked, steamed. low fat dressings. avoid french fries).\par DO NOT CONSUME FOODS YOU ARE ALLERGIC TO DESPITE DIETARY RECOMMENDATIONS.\par \par For more information you can visit [email or web address] \par \par Incorporate regular physical activity most days of the week. \par Regular aerobic activity- moderate intensity, most days/wk, at least 30min/day- ex. brisk walk 2.5miles/hr, ballroom dancing, water aerobics, light yard work (raking/lawn mowing) actively playing basketball, biking 10miles/hr.\par Muscle strengthening and strength/resistance exercises 2-3days/wk- ex. free weights, resistance bands, your own weight (squats/pull-ups/push-ups).\par Neuro-motor exercises for balance/agility/coordination and flexibility exercises 2-3days/wk, 20-30min/day- ex. yoga and joseph-chi.\par Bone strengthening at least 30min/day, most days of the week- ex. weights, resistance bands, running, brisk walking, jumping rope, stair climbing, tennis.\par Decrease sedentary time. \par LISTEN TO YOUR BODY AND MODIFY ACTIVITY AS NEEDED- DO NOT OVEREXERT YOURSELF DURING PHYSICAL ACTIVITY DESPITE RECOMMENDATION.\par \par For more information you can visit [email or web address] \par \par you are a fall risk, falls can cause serious injury and complications- encourage slow positional changes, do not walk/run unattended, walk with assistive devices as appropriate, do not drive; patient should be driven if needed. notify/return to office if worsening/persistent symptoms or new onset complaints/concerns, in the event of any emergency or life threatening condition, go to the nearest emergency department and then notify office. \par \par PATIENT REFUSED  PHONE R/T CONFUSION WITH USE and advancing dementia, Patient's daughter utilized for translation and communicates for patient.

## 2020-10-20 NOTE — PLAN
[FreeTextEntry1] : CMP checked today- f/u results. \par Start Simvastatin as prescribed for management of hyperlipidemia. notify our office if any s/s side effects or adverse reaction. for serious/potentially life threatening reactions- go to nearest emergency department and then notify our office.\par continue with plans for f/u with neurology, cardiology, and f/u with GI as discussed.\par patient to have all reports sent to our office for review and record keeping.\par flu vaccine administered today, counseled on vaccine, tolerated well. \par medication renewals as discussed above. \par notify office if worsening/persistent symptoms or new onset complaints/concerns, in the event of any emergency or life threatening condition, go to the nearest emergency department and then notify office. \par patient verbalized understanding and agrees with plan of care. \par \par PATIENT REFUSED  PHONE R/T CONFUSION WITH USE and advancing dementia, Patient's daughter utilized for translation and communicates for patient.

## 2020-10-20 NOTE — CURRENT MEDS
[Takes medication as prescribed] : takes [None] : Patient does not have any barriers to medication adherence [FreeTextEntry1] : med rec completed

## 2020-10-20 NOTE — PHYSICAL EXAM
[No Acute Distress] : no acute distress [Well Nourished] : well nourished [No Respiratory Distress] : no respiratory distress  [No Accessory Muscle Use] : no accessory muscle use [Clear to Auscultation] : lungs were clear to auscultation bilaterally [Normal] : normal rate, regular rhythm, normal S1 and S2 and no murmur heard [Coordination Grossly Intact] : coordination grossly intact [Normal Gait] : normal gait [Normal Affect] : the affect was normal [Well Developed] : well developed [PERRL] : pupils equal round and reactive to light [No Lymphadenopathy] : no lymphadenopathy [No Edema] : there was no peripheral edema [Soft] : abdomen soft [Non Tender] : non-tender [Non-distended] : non-distended [Normal Posterior Cervical Nodes] : no posterior cervical lymphadenopathy [No CVA Tenderness] : no CVA  tenderness [Normal Anterior Cervical Nodes] : no anterior cervical lymphadenopathy [Appropriate] : appropriate [No Rash] : no rash [de-identified] : central obesity [de-identified] : PATIENT CONFUSED AT BASELINE A/W ADVANCING DEMENTIA, Patient's daughter utilized for translation and communicates for patient.

## 2020-10-20 NOTE — HISTORY OF PRESENT ILLNESS
[FreeTextEntry1] : pt. here for check up and flu shot [de-identified] : This is a 70y/o Israeli speaking female here today for f/u visit with medication refill. \par PMH HTN and hyperlipidemia; patient's daughter reports patient does not have any antihypertensive medications left. BP was to be managed by cardiology Dr. Dumont- NP called over to [facility] office and spoke with Nurse, as per Nurse- patient followed up with cardiology 07/02/20 for poorly controlled HTN in setting of BLE swelling, Dr. Dumont discontinued Amlodipine and started patient on Labetalol 100mg bid, continued Losartan and Hydrochlorothiazide as prescribed, patient was scheduled to f/u with cardiology Dr. Dumont 09/08/20 for further med renewals which she no showed and Dr. Dumont could not provide renewals. NP agrees to provide renewal for 30 day supply of Hydrochlorothiazide and Losartan and patient and daughter to schedule f/u with Dr. Dumont for continued management.\par patient's daughter also denies patient started taking Simvastatin as prescribed, reports was not covered by insurance so she did not  prescription. NP sent new prescription to [facility], provided Good Rx coupon for 3 months supply, patient and daughter agree to  medication and for patient to take as prescribed- will recheck lipid panel and hgba1c in 3 months. \par also requesting refills for daily low dose aspirin and vitamin D.\par Alzheimers dementia; follows up with neurology Dr. Alexander, specialist provided renewals of Nortriptyline and Memantine. patient was scheduled to f/u with Dr. Alexander in May 2020, apt. was canceled a/w COVID19 pandemic, patient's daughter reports will be scheduling apt. for f/u.\par patient also completed colonoscopy in July, 2020; as per report- polyps biopsied, diverticulosis noted, high fiber diet recommended and repeat colonoscopy in 3 years, no pathology reports received. patient and daughter encouraged to f/u with Dr. Santiago for pathology results and have copy sent to PCP. \par patient's daughter requesting flu vaccine for patient today, denies previous allergies to flu vaccine or eggs or adverse reaction or s/e to flu vaccine, afebrile and reports feeling well. \par o/w no other major concerns, patient reports feeling well today.\par \par PATIENT REFUSED  PHONE R/T CONFUSION WITH USE and advancing dementia, Patient's daughter utilized for translation and communicates for patient.

## 2020-10-21 ENCOUNTER — NON-APPOINTMENT (OUTPATIENT)
Age: 70
End: 2020-10-21

## 2020-10-21 RX ORDER — MEMANTINE HYDROCHLORIDE 21 MG/1
21 CAPSULE, EXTENDED RELEASE ORAL
Qty: 30 | Refills: 5 | Status: COMPLETED | COMMUNITY
Start: 2020-10-13 | End: 2020-10-21

## 2020-10-22 ENCOUNTER — NON-APPOINTMENT (OUTPATIENT)
Age: 70
End: 2020-10-22

## 2020-10-22 LAB
ALBUMIN SERPL ELPH-MCNC: 4.1 G/DL
ALP BLD-CCNC: 64 U/L
ALT SERPL-CCNC: 13 U/L
ANION GAP SERPL CALC-SCNC: 10 MMOL/L
AST SERPL-CCNC: 20 U/L
BILIRUB SERPL-MCNC: 0.3 MG/DL
BUN SERPL-MCNC: 20 MG/DL
CALCIUM SERPL-MCNC: 10 MG/DL
CHLORIDE SERPL-SCNC: 104 MMOL/L
CO2 SERPL-SCNC: 27 MMOL/L
CREAT SERPL-MCNC: 0.92 MG/DL
GLUCOSE SERPL-MCNC: 98 MG/DL
POTASSIUM SERPL-SCNC: 5.1 MMOL/L
PROT SERPL-MCNC: 7.3 G/DL
SODIUM SERPL-SCNC: 141 MMOL/L

## 2020-11-09 ENCOUNTER — RX RENEWAL (OUTPATIENT)
Age: 70
End: 2020-11-09

## 2020-12-18 ENCOUNTER — RX RENEWAL (OUTPATIENT)
Age: 70
End: 2020-12-18

## 2021-01-05 ENCOUNTER — RX RENEWAL (OUTPATIENT)
Age: 71
End: 2021-01-05

## 2021-01-19 ENCOUNTER — APPOINTMENT (OUTPATIENT)
Dept: FAMILY MEDICINE | Facility: CLINIC | Age: 71
End: 2021-01-19
Payer: MEDICAID

## 2021-01-19 VITALS — OXYGEN SATURATION: 98 % | HEART RATE: 87 BPM | TEMPERATURE: 97.3 F | RESPIRATION RATE: 14 BRPM

## 2021-01-19 VITALS — DIASTOLIC BLOOD PRESSURE: 70 MMHG | SYSTOLIC BLOOD PRESSURE: 130 MMHG

## 2021-01-19 PROCEDURE — G0009: CPT

## 2021-01-19 PROCEDURE — 93000 ELECTROCARDIOGRAM COMPLETE: CPT

## 2021-01-19 PROCEDURE — 99213 OFFICE O/P EST LOW 20 MIN: CPT | Mod: 25

## 2021-01-19 PROCEDURE — 99072 ADDL SUPL MATRL&STAF TM PHE: CPT

## 2021-01-19 PROCEDURE — 90670 PCV13 VACCINE IM: CPT

## 2021-01-19 RX ORDER — LABETALOL HYDROCHLORIDE 100 MG/1
100 TABLET, FILM COATED ORAL
Refills: 0 | Status: COMPLETED | COMMUNITY
Start: 2020-10-20 | End: 2021-01-19

## 2021-01-19 RX ORDER — LABETALOL HYDROCHLORIDE 300 MG/1
300 TABLET, FILM COATED ORAL
Qty: 60 | Refills: 0 | Status: COMPLETED | COMMUNITY
Start: 2020-12-15

## 2021-01-19 NOTE — HISTORY OF PRESENT ILLNESS
[Other: _____] : [unfilled] [Patient Declined  Services] : - None: Patient declined  services [FreeTextEntry1] : here for f/u and med refill  [de-identified] : This is a 71y/o Swiss speaking female here today for f/u visit with medication refill. \par PMH HTN and hyperlipidemia; patient of Dr. Dumont cardiology, reports taking Losartan, HCTZ as prescribed, Labetalol was increased to 200mg bid by Dr. Dumont, patient's daughter reports patient compliance with medications and tolerating well. Dr. Dumont providing renewals, next f/u scheduled for May 2021. reports BLE swelling has resolved. o/w denies concerns.\par patient's daughter reports compliance with Simvastatin as prescribed, tolerating well without concerns- requesting refill. will check labs and refill as appropriate. also requesting refills for daily low dose aspirin and vitamin D.\par Alzheimers dementia; follows up with neurology Dr. Alexander, specialist provided renewals of Nortriptyline and Memantine. patient's daughter reports patient was scheduled to f/u with Dr. Alexander in May 2020, apt. was canceled a/w COVID19 pandemic, patient's daughter reports will be scheduling apt. for f/u. denies having yet scheduled- counseled on importance of maintaining f/u apt.s as recommended. patient's daughter verbalized understanding and agrees to schedule. \par o/w no other major concerns, patient reports feeling well today.\par \par PATIENT REFUSED  PHONE R/T CONFUSION WITH USE and advancing dementia, Patient's daughter utilized for translation and communicates for patient.

## 2021-01-19 NOTE — HEALTH RISK ASSESSMENT
[No] : In the past 12 months have you used drugs other than those required for medical reasons? No [No falls in past year] : Patient reported no falls in the past year [0] : 2) Feeling down, depressed, or hopeless: Not at all (0) [Some assistance needed] : feeding [Patient declined mammogram] : Patient declined mammogram [Patient declined PAP Smear] : Patient declined PAP Smear [Patient declined bone density test] : Patient declined bone density test [Patient reported colonoscopy was normal] : Patient reported colonoscopy was normal [HIV test declined] : HIV test declined [Hepatitis C test declined] : Hepatitis C test declined [Cultural] : cultural [With Family] : lives with family [Unemployed] : unemployed [] :  [Feels Safe at Home] : Feels safe at home [Reports normal functional visual acuity (ie: able to read med bottle)] : Reports normal functional visual acuity [Smoke Detector] : smoke detector [Carbon Monoxide Detector] : carbon monoxide detector [Seat Belt] :  uses seat belt [Sunscreen] : uses sunscreen [Change in mental status noted] : No change in mental status noted [Language] : denies difficulty with language [Sexually Active] : not sexually active [High Risk Behavior] : no high risk behavior [Reports changes in hearing] : Reports no changes in hearing [Reports changes in vision] : Reports no changes in vision [Reports changes in dental health] : Reports no changes in dental health [Guns at Home] : no guns at home [Safety elements used in home] : no safety elements used in home [Travel to Developing Areas] : does not  travel to developing areas [TB Exposure] : is not being exposed to tuberculosis [Caregiver Concerns] : does not have caregiver concerns [ColonoscopyDate] : 07/20 [de-identified] : as discussed above [de-identified] : as discussed above [] : No [de-identified] : walking daily [de-identified] : well balanced [[id number]] : 0

## 2021-01-19 NOTE — PHYSICAL EXAM
[Well Nourished] : well nourished [Well Developed] : well developed [No Lymphadenopathy] : no lymphadenopathy [No Respiratory Distress] : no respiratory distress  [No Accessory Muscle Use] : no accessory muscle use [Clear to Auscultation] : lungs were clear to auscultation bilaterally [Normal] : normal rate, regular rhythm, normal S1 and S2 and no murmur heard [No Edema] : there was no peripheral edema [Soft] : abdomen soft [Non Tender] : non-tender [Non-distended] : non-distended [Normal Posterior Cervical Nodes] : no posterior cervical lymphadenopathy [Normal Anterior Cervical Nodes] : no anterior cervical lymphadenopathy [No CVA Tenderness] : no CVA  tenderness [No Rash] : no rash [Coordination Grossly Intact] : coordination grossly intact [Normal Gait] : normal gait [Normal Affect] : the affect was normal [Appropriate] : appropriate [de-identified] : central obesity [de-identified] : left knee ROM maintained, gait stable/appropriate for age, tender on palpation.  [de-identified] : PATIENT CONFUSED AT BASELINE A/W ADVANCING DEMENTIA, Patient's daughter utilized for translation and communicates for patient.

## 2021-01-19 NOTE — PLAN
[FreeTextEntry1] : labs checked today- f/u results. \par complete XR left knee and f/u with ortho as discussed. \par continue with plans for f/u with neurology, cardiology as discussed.\par patient to have all reports sent to our office for review and record keeping.\par pneumonia vaccine administered today, counseled on vaccine, tolerated well. \par medication renewals as discussed above. \par notify office if worsening/persistent symptoms or new onset complaints/concerns, in the event of any emergency or life threatening condition, go to the nearest emergency department and then notify office. \par patient verbalized understanding and agrees with plan of care. \par \par PATIENT REFUSED  PHONE R/T CONFUSION WITH USE and advancing dementia, Patient's daughter utilized for translation and communicates for patient.

## 2021-01-19 NOTE — REVIEW OF SYSTEMS
[Joint Pain] : joint pain [Joint Swelling] : joint swelling [Muscle Weakness] : muscle weakness [Confusion] : confusion [Negative] : Heme/Lymph [Joint Stiffness] : no joint stiffness [Muscle Pain] : no muscle pain [Back Pain] : no back pain [Skin Rash] : no skin rash [FreeTextEntry9] : left knee pain with reports weakness at times- will check XR and refer ortho [de-identified] : at baseline a/w advancing dementia

## 2021-01-19 NOTE — COUNSELING
[Behavioral health counseling provided] : Behavioral health counseling provided [Sleep ___ hours/day] : Sleep [unfilled] hours/day [Engage in a relaxing activity] : Engage in a relaxing activity [Plan in advance] : Plan in advance [None] : None [Good understanding] : Patient has a good understanding of lifestyle changes and steps needed to achieve self management goal [de-identified] : extremity pain education-\par it is important to remain active, listen to the body and do activity as tolerated. application of ice or heat and warm showers/baths with stretching can help with pain relief. there are non-medicinal approaches to help pain relief as well such as massage or acupuncture, however, there is limited evidence to support the use of these alternative methods and you should discuss appropriateness with a medical professional before use. please seek emergency medical care and then notify our office in the event of new onset worsening pain, unrelieved with previous methods of pain relief, new onset fever, weakness, numbness, tingling, etc.

## 2021-01-21 ENCOUNTER — NON-APPOINTMENT (OUTPATIENT)
Age: 71
End: 2021-01-21

## 2021-01-21 LAB
25(OH)D3 SERPL-MCNC: 42.1 NG/ML
ALBUMIN SERPL ELPH-MCNC: 4.2 G/DL
ALP BLD-CCNC: 70 U/L
ALT SERPL-CCNC: 7 U/L
ANION GAP SERPL CALC-SCNC: 12 MMOL/L
AST SERPL-CCNC: 16 U/L
BASOPHILS # BLD AUTO: 0.05 K/UL
BASOPHILS NFR BLD AUTO: 0.6 %
BILIRUB SERPL-MCNC: 0.2 MG/DL
BUN SERPL-MCNC: 24 MG/DL
CALCIUM SERPL-MCNC: 9.7 MG/DL
CHLORIDE SERPL-SCNC: 104 MMOL/L
CHOLEST SERPL-MCNC: 191 MG/DL
CO2 SERPL-SCNC: 25 MMOL/L
CREAT SERPL-MCNC: 1.23 MG/DL
EOSINOPHIL # BLD AUTO: 0.27 K/UL
EOSINOPHIL NFR BLD AUTO: 3.1 %
ESTIMATED AVERAGE GLUCOSE: 131 MG/DL
GLUCOSE SERPL-MCNC: 89 MG/DL
HBA1C MFR BLD HPLC: 6.2 %
HCT VFR BLD CALC: 38.5 %
HDLC SERPL-MCNC: 59 MG/DL
HGB BLD-MCNC: 12.1 G/DL
IMM GRANULOCYTES NFR BLD AUTO: 0.2 %
LDLC SERPL CALC-MCNC: 91 MG/DL
LYMPHOCYTES # BLD AUTO: 2.3 K/UL
LYMPHOCYTES NFR BLD AUTO: 26.7 %
MAN DIFF?: NORMAL
MCHC RBC-ENTMCNC: 30.4 PG
MCHC RBC-ENTMCNC: 31.4 GM/DL
MCV RBC AUTO: 96.7 FL
MONOCYTES # BLD AUTO: 0.5 K/UL
MONOCYTES NFR BLD AUTO: 5.8 %
NEUTROPHILS # BLD AUTO: 5.48 K/UL
NEUTROPHILS NFR BLD AUTO: 63.6 %
NONHDLC SERPL-MCNC: 131 MG/DL
PLATELET # BLD AUTO: 214 K/UL
POTASSIUM SERPL-SCNC: 3.9 MMOL/L
PROT SERPL-MCNC: 7.4 G/DL
RBC # BLD: 3.98 M/UL
RBC # FLD: 13.1 %
SODIUM SERPL-SCNC: 141 MMOL/L
TRIGL SERPL-MCNC: 202 MG/DL
WBC # FLD AUTO: 8.62 K/UL

## 2021-04-17 ENCOUNTER — RX RENEWAL (OUTPATIENT)
Age: 71
End: 2021-04-17

## 2021-04-18 ENCOUNTER — RX RENEWAL (OUTPATIENT)
Age: 71
End: 2021-04-18

## 2021-04-19 ENCOUNTER — RX RENEWAL (OUTPATIENT)
Age: 71
End: 2021-04-19

## 2021-04-27 ENCOUNTER — APPOINTMENT (OUTPATIENT)
Dept: FAMILY MEDICINE | Facility: CLINIC | Age: 71
End: 2021-04-27
Payer: MEDICAID

## 2021-04-27 VITALS
WEIGHT: 177 LBS | BODY MASS INDEX: 34.57 KG/M2 | OXYGEN SATURATION: 98 % | TEMPERATURE: 99.3 F | RESPIRATION RATE: 14 BRPM | HEART RATE: 89 BPM

## 2021-04-27 VITALS — SYSTOLIC BLOOD PRESSURE: 115 MMHG | DIASTOLIC BLOOD PRESSURE: 75 MMHG

## 2021-04-27 DIAGNOSIS — R68.89 OTHER GENERAL SYMPTOMS AND SIGNS: ICD-10-CM

## 2021-04-27 LAB
BILIRUB UR QL STRIP: NORMAL
GLUCOSE UR-MCNC: NORMAL
HCG UR QL: 0.2 EU/DL
HGB UR QL STRIP.AUTO: NORMAL
KETONES UR-MCNC: NORMAL
LEUKOCYTE ESTERASE UR QL STRIP: NORMAL
NITRITE UR QL STRIP: NORMAL
PH UR STRIP: 7.5
PROT UR STRIP-MCNC: NORMAL
SP GR UR STRIP: 1.02

## 2021-04-27 PROCEDURE — 99214 OFFICE O/P EST MOD 30 MIN: CPT

## 2021-04-27 PROCEDURE — 99072 ADDL SUPL MATRL&STAF TM PHE: CPT

## 2021-04-27 RX ORDER — ASPIRIN ENTERIC COATED TABLETS 81 MG 81 MG/1
81 TABLET, DELAYED RELEASE ORAL DAILY
Qty: 90 | Refills: 0 | Status: COMPLETED | COMMUNITY
Start: 2018-12-19 | End: 2021-04-27

## 2021-04-27 NOTE — COUNSELING
[Fall prevention counseling provided] : Fall prevention counseling provided [Adequate lighting] : Adequate lighting [No throw rugs] : No throw rugs [Use proper foot wear] : Use proper foot wear [Use recommended devices] : Use recommended devices [Behavioral health counseling provided] : Behavioral health counseling provided [Sleep ___ hours/day] : Sleep [unfilled] hours/day [Engage in a relaxing activity] : Engage in a relaxing activity [Plan in advance] : Plan in advance [Potential consequences of obesity discussed] : Potential consequences of obesity discussed [Benefits of weight loss discussed] : Benefits of weight loss discussed [None] : None [Good understanding] : Patient has a good understanding of lifestyle changes and steps needed to achieve self management goal [de-identified] : Maintain balanced diet of whole grain, fruits and vegetables, lean meats, skinless poultry, fish, beans, soy products, eggs, nuts, and low fat dairy as per FDA dietary guidelines. \par Avoid high calorie/low nutrient foods. \par vegetables/fruits- at least 5 servings/day, \par whole grains- 100% whole grain and at least 3 grams fiber/day.\par reduce/eliminate saturated fat- less than 5% on nutrition labels (ex. stone, deli meat, hot dogs, fried foods, cookies, ice cream, chips, soft drinks, etc.)\par stay within your FDA recommended daily calorie needs or use the plate method to portion intake. \par Avoid fast food and limit eating out. When eating out choose healthy alternatives (ex. grilled, baked, steamed. low fat dressings. avoid french fries).\par DO NOT CONSUME FOODS YOU ARE ALLERGIC TO DESPITE DIETARY RECOMMENDATIONS.\par \par For more information you can visit www.Health.gov \par \par Incorporate regular physical activity most days of the week. \par Regular aerobic activity- moderate intensity, most days/wk, at least 30min/day- ex. brisk walk 2.5miles/hr, ballroom dancing, water aerobics, light yard work (raking/lawn mowing) actively playing basketball, biking 10miles/hr.\par Muscle strengthening and strength/resistance exercises 2-3days/wk- ex. free weights, resistance bands, your own weight (squats/pull-ups/push-ups).\par Neuro-motor exercises for balance/agility/coordination and flexibility exercises 2-3days/wk, 20-30min/day- ex. yoga and joseph-chi.\par Bone strengthening at least 30min/day, most days of the week- ex. weights, resistance bands, running, brisk walking, jumping rope, stair climbing, tennis.\par Decrease sedentary time. \par LISTEN TO YOUR BODY AND MODIFY ACTIVITY AS NEEDED- DO NOT OVEREXERT YOURSELF DURING PHYSICAL ACTIVITY DESPITE RECOMMENDATION.\par \par For more information you can visit www.Health.gov \par \par you are a fall risk, falls can cause serious injury and complications- encourage slow positional changes, do not walk/run unattended, walk with assistive devices as appropriate, do not drive; patient should be driven if needed. notify/return to office if worsening/persistent symptoms or new onset complaints/concerns, in the event of any emergency or life threatening condition, go to the nearest emergency department and then notify office.

## 2021-04-27 NOTE — HEALTH RISK ASSESSMENT
[No] : In the past 12 months have you used drugs other than those required for medical reasons? No [No falls in past year] : Patient reported no falls in the past year [0] : 2) Feeling down, depressed, or hopeless: Not at all (0) [] : No [de-identified] : walking daily [de-identified] : well balanced  [[id number]] : 0

## 2021-04-27 NOTE — HISTORY OF PRESENT ILLNESS
[Patient Declined  Services] : - None: Patient declined  services [FreeTextEntry1] : following up chronic concerns and acute concern [de-identified] : This is a 71y/o [place] speaking female pmhx Htn/ hld/ prediabetes/ Alzheimers dementia, here today for f/u visit with medication refill. \par patient of Dr. Dumont cardiology, reports taking Losartan, HCTZ, Labetalol as prescribed, BP well controlled, next f/u with Dr. Dumont scheduled for May, 2021. \par patient of neurology Dr. Alexander, reports taking Nortriptyline and Memantine as prescribed. reports having scheduled apt. with Dr. Alexander for May 2021- no apt. visible in system, patient's daughter made aware to f/u with office ensure she is scheduled for f/u. \par o/w reports sinus pressure and nasal congestion a/w with AR, taking Allegra as directed started today- requesting intervention. will evaluate and manage as appropriate.\par reports continues with memory loss, constantly needs to be reminded, forgets names, forgets loss of child- NP advised patient's daughter this may be progression of advancing dementia, however will also check BW and u/a for underlying infx possibly a/w confusion as well. \par no other major concerns, patient reports feeling well today.\par \par PATIENT REFUSED  PHONE R/T CONFUSION WITH USE and advancing dementia, Patient's daughter utilized for translation and communicates for patient.

## 2021-04-27 NOTE — PHYSICAL EXAM
[Well Nourished] : well nourished [Well Developed] : well developed [No Lymphadenopathy] : no lymphadenopathy [No Respiratory Distress] : no respiratory distress  [No Accessory Muscle Use] : no accessory muscle use [Clear to Auscultation] : lungs were clear to auscultation bilaterally [Normal] : normal rate, regular rhythm, normal S1 and S2 and no murmur heard [No Edema] : there was no peripheral edema [Soft] : abdomen soft [Non Tender] : non-tender [Non-distended] : non-distended [Normal Posterior Cervical Nodes] : no posterior cervical lymphadenopathy [Normal Anterior Cervical Nodes] : no anterior cervical lymphadenopathy [No CVA Tenderness] : no CVA  tenderness [No Rash] : no rash [Coordination Grossly Intact] : coordination grossly intact [Normal Gait] : normal gait [Normal Affect] : the affect was normal [Appropriate] : appropriate [de-identified] : buggy nasal mucosa, +PND  [de-identified] : central obesity [de-identified] : left knee ROM wdl, denies concerns at this time [de-identified] : PATIENT CONFUSED AT BASELINE A/W ADVANCING DEMENTIA, Patient's daughter utilized for translation and communicates for patient.

## 2021-04-27 NOTE — PLAN
[FreeTextEntry1] : patient to have all reports sent to our office for review and record keeping.\par medication renewals as discussed above. \par notify office if worsening/persistent symptoms or new onset complaints/concerns, in the event of any emergency or life threatening condition, go to the nearest emergency department and then notify office. \par patient verbalized understanding and agrees with plan of care. \par \par PATIENT REFUSED  PHONE R/T CONFUSION WITH USE and advancing dementia, Patient's daughter utilized for translation and communicates for patient.

## 2021-04-27 NOTE — ASSESSMENT
[FreeTextEntry1] : advancing Alzheimer dementia/ progressive memory loss\par on Memantine & Nortriptyline\par check cbc - "labs drawn in office" \par check u/a\par f/u with neurology Dr. Alexander\par \par htn/ hld\par on HCTZ, BB, Arb, statin\par f/u with Dr. Dumont cardiology as scheduled\par provided renewal Simvastatin and low dose asprin\par check lipid panel and cmp\par \par prediabetes\par recheck a1c\par continue lifestyle modification\par \par Low VitD\par value previously within range\par c/w OTC D3 as directed\par \par AR/ PND\par on Allegra\par start fluticasone \par \par OA of knee\par refuses intervention\par \par preventative health\par needs annual \par no longer complete colonoscopy, mammo, DEXA\par pneumococcal vaccine 01/19/2022

## 2021-04-27 NOTE — REVIEW OF SYSTEMS
[Itching] : itching [Nasal Discharge] : nasal discharge [Postnasal Drip] : postnasal drip [Confusion] : confusion [Negative] : Heme/Lymph [Discharge] : no discharge [Pain] : no pain [Redness] : no redness [Dryness] : no dryness  [Vision Problems] : no vision problems [Earache] : no earache [Hearing Loss] : no hearing loss [Nosebleed] : no nosebleeds [Hoarseness] : no hoarseness [Sore Throat] : no sore throat [Skin Rash] : no skin rash [FreeTextEntry3] : eye watering [FreeTextEntry4] : sneezing [FreeTextEntry9] : OA of right knee, never followed up with ortho s/s resolved [de-identified] : forgetfulness/ confusion as discussed above, at baseline a/w advancing dementia

## 2021-04-29 LAB
APPEARANCE: CLEAR
BACTERIA: NEGATIVE
BILIRUBIN URINE: NEGATIVE
BLOOD URINE: NEGATIVE
COLOR: NORMAL
GLUCOSE QUALITATIVE U: NEGATIVE
HYALINE CASTS: 0 /LPF
KETONES URINE: NEGATIVE
LEUKOCYTE ESTERASE URINE: NEGATIVE
MICROSCOPIC-UA: NORMAL
NITRITE URINE: NEGATIVE
PH URINE: 7.5
PROTEIN URINE: ABNORMAL
RED BLOOD CELLS URINE: 2 /HPF
SPECIFIC GRAVITY URINE: 1.02
SQUAMOUS EPITHELIAL CELLS: 1 /HPF
UROBILINOGEN URINE: NORMAL
WHITE BLOOD CELLS URINE: 0 /HPF

## 2021-05-04 ENCOUNTER — APPOINTMENT (OUTPATIENT)
Dept: FAMILY MEDICINE | Facility: CLINIC | Age: 71
End: 2021-05-04
Payer: MEDICAID

## 2021-05-04 PROCEDURE — 99072 ADDL SUPL MATRL&STAF TM PHE: CPT

## 2021-05-04 PROCEDURE — 36415 COLL VENOUS BLD VENIPUNCTURE: CPT

## 2021-05-06 ENCOUNTER — NON-APPOINTMENT (OUTPATIENT)
Age: 71
End: 2021-05-06

## 2021-05-06 LAB
ALBUMIN SERPL ELPH-MCNC: 4 G/DL
ALP BLD-CCNC: 62 U/L
ALT SERPL-CCNC: 14 U/L
ANION GAP SERPL CALC-SCNC: 12 MMOL/L
AST SERPL-CCNC: 19 U/L
BASOPHILS # BLD AUTO: 0.07 K/UL
BASOPHILS NFR BLD AUTO: 1 %
BILIRUB SERPL-MCNC: 0.2 MG/DL
BUN SERPL-MCNC: 26 MG/DL
CALCIUM SERPL-MCNC: 10.5 MG/DL
CHLORIDE SERPL-SCNC: 104 MMOL/L
CHOLEST SERPL-MCNC: 178 MG/DL
CO2 SERPL-SCNC: 26 MMOL/L
CREAT SERPL-MCNC: 1.21 MG/DL
EOSINOPHIL # BLD AUTO: 0.42 K/UL
EOSINOPHIL NFR BLD AUTO: 6.2 %
ESTIMATED AVERAGE GLUCOSE: 137 MG/DL
GLUCOSE SERPL-MCNC: 112 MG/DL
HBA1C MFR BLD HPLC: 6.4 %
HCT VFR BLD CALC: 35.5 %
HDLC SERPL-MCNC: 55 MG/DL
HGB BLD-MCNC: 11.5 G/DL
IMM GRANULOCYTES NFR BLD AUTO: 0.6 %
LDLC SERPL CALC-MCNC: 97 MG/DL
LYMPHOCYTES # BLD AUTO: 2.53 K/UL
LYMPHOCYTES NFR BLD AUTO: 37.4 %
MAN DIFF?: NORMAL
MCHC RBC-ENTMCNC: 30.8 PG
MCHC RBC-ENTMCNC: 32.4 GM/DL
MCV RBC AUTO: 95.2 FL
MONOCYTES # BLD AUTO: 0.42 K/UL
MONOCYTES NFR BLD AUTO: 6.2 %
NEUTROPHILS # BLD AUTO: 3.28 K/UL
NEUTROPHILS NFR BLD AUTO: 48.6 %
NONHDLC SERPL-MCNC: 123 MG/DL
PLATELET # BLD AUTO: 210 K/UL
POTASSIUM SERPL-SCNC: 4.2 MMOL/L
PROT SERPL-MCNC: 7.3 G/DL
RBC # BLD: 3.73 M/UL
RBC # FLD: 13.1 %
SODIUM SERPL-SCNC: 142 MMOL/L
TRIGL SERPL-MCNC: 133 MG/DL
WBC # FLD AUTO: 6.76 K/UL

## 2021-05-19 ENCOUNTER — APPOINTMENT (OUTPATIENT)
Dept: NEUROLOGY | Facility: CLINIC | Age: 71
End: 2021-05-19
Payer: MEDICAID

## 2021-05-19 VITALS
HEIGHT: 60 IN | WEIGHT: 180 LBS | DIASTOLIC BLOOD PRESSURE: 80 MMHG | TEMPERATURE: 98.2 F | SYSTOLIC BLOOD PRESSURE: 150 MMHG | BODY MASS INDEX: 35.34 KG/M2

## 2021-05-19 PROCEDURE — 99214 OFFICE O/P EST MOD 30 MIN: CPT

## 2021-05-19 NOTE — ASSESSMENT
[FreeTextEntry1] : This is a 70 year-old woman with chronic headaches that have improved with nortriptyline.\par She is currently on 25 mg at bedtime.\par \par She also has some mild cognitive impairment likely representing early Alzheimer's type dementia. I will continue Memantine 10 mg BID. \par \par She has been having intermittent tremors predominantly of her head.\par These were not visible today.\par Neurologic workup has been negative for this.\par \par I will see her back in 6 months.

## 2021-05-19 NOTE — HISTORY OF PRESENT ILLNESS
[FreeTextEntry1] : I saw this patient in the office today.\par \par As you recall she is a history of chronic headaches for many years. I had started her on nortriptyline. \par She had initially reported good improvement.\par She had an increase in her headaches and increased nortriptyline back to 25 mg at bedtime.\par The headaches are not bother her significantly at present.\par \par She also has some cognitive impairment likely representing early Alzheimer's type dementia. \par She had side effects on Aricept (palpitations). She has been on Namenda extended release 21 mg. She had side effects of 28 mg.\par She is now on generic Memantine 10 mg BID. \par \par She has had some tremors intermittently.\par There have been no findings of parkinsonism, and dopamine spectroscopy scan was normal.\par \par \par \par

## 2021-05-19 NOTE — PHYSICAL EXAM
[General Appearance - Alert] : alert [General Appearance - In No Acute Distress] : in no acute distress [Oriented To Time, Place, And Person] : oriented to person, place, and time [Cranial Nerves Optic (II)] : visual acuity intact bilaterally,  visual fields full to confrontation, pupils equal round and reactive to light [Cranial Nerves Oculomotor (III)] : extraocular motion intact [Cranial Nerves Trigeminal (V)] : facial sensation intact symmetrically [Cranial Nerves Facial (VII)] : face symmetrical [Cranial Nerves Vestibulocochlear (VIII)] : hearing was intact bilaterally [Cranial Nerves Glossopharyngeal (IX)] : tongue and palate midline [Cranial Nerves Accessory (XI - Cranial And Spinal)] : head turning and shoulder shrug symmetric [Cranial Nerves Hypoglossal (XII)] : there was no tongue deviation with protrusion [Motor Tone] : muscle tone was normal in all four extremities [Motor Strength] : muscle strength was normal in all four extremities [Sensation Tactile Decrease] : light touch was intact [Sensation Pain / Temperature Decrease] : pain and temperature was intact [Sensation Vibration Decrease] : vibration was intact [Abnormal Walk] : normal gait [2+] : Patella left 2+ [Optic Disc Abnormality] : the optic disc were normal in size and color [Edema] : there was no peripheral edema [Involuntary Movements] : no involuntary movements were seen [Recall ___ / 3] : recall [unfilled] / 3 [FreeTextEntry1] : Short-term memory is impaired. Remote memory is relatively well preserved. [Dysarthria] : no dysarthria [Aphasia] : no dysphasia/aphasia [Romberg's Sign] : Romberg's sign was negtive [Tremor] : no tremor present [Coordination - Dysmetria Impaired Finger-to-Nose Bilateral] : not present [Plantar Reflex Right Only] : normal on the right [Plantar Reflex Left Only] : normal on the left

## 2021-05-19 NOTE — DATA REVIEWED
[de-identified] : Brain MRI was performed on 2/23/13. \par The study demonstrated only ischemic white matter change of a chronic nature. [de-identified] : EEG was normal. [de-identified] : Katya scan was normal.  [de-identified] : B12 level was normal, RPR was nonreactive.\par Sedimentation rate was 17.\par Thyroid function was normal.

## 2021-05-19 NOTE — CONSULT LETTER
[Dear  ___] : Dear ~JOSE JUAN, [Courtesy Letter:] : I had the pleasure of seeing your patient, [unfilled], in my office today. [Please see my note below.] : Please see my note below. [Consult Closing:] : Thank you very much for allowing me to participate in the care of this patient.  If you have any questions, please do not hesitate to contact me. [Sincerely,] : Sincerely, [FreeTextEntry3] : Richard Alexander MD.

## 2021-05-27 ENCOUNTER — RX RENEWAL (OUTPATIENT)
Age: 71
End: 2021-05-27

## 2021-07-13 ENCOUNTER — RX RENEWAL (OUTPATIENT)
Age: 71
End: 2021-07-13

## 2021-07-13 RX ORDER — FLUTICASONE PROPIONATE 50 UG/1
50 SPRAY, METERED NASAL DAILY
Qty: 16 | Refills: 2 | Status: ACTIVE | COMMUNITY
Start: 2021-04-27 | End: 1900-01-01

## 2021-07-27 ENCOUNTER — APPOINTMENT (OUTPATIENT)
Dept: FAMILY MEDICINE | Facility: CLINIC | Age: 71
End: 2021-07-27
Payer: MEDICAID

## 2021-07-27 VITALS
RESPIRATION RATE: 14 BRPM | HEIGHT: 60 IN | WEIGHT: 172.2 LBS | OXYGEN SATURATION: 96 % | TEMPERATURE: 97.4 F | BODY MASS INDEX: 33.81 KG/M2 | HEART RATE: 82 BPM

## 2021-07-27 VITALS — SYSTOLIC BLOOD PRESSURE: 130 MMHG | DIASTOLIC BLOOD PRESSURE: 78 MMHG

## 2021-07-27 DIAGNOSIS — Z76.0 ENCOUNTER FOR ISSUE OF REPEAT PRESCRIPTION: ICD-10-CM

## 2021-07-27 DIAGNOSIS — J30.9 ALLERGIC RHINITIS, UNSPECIFIED: ICD-10-CM

## 2021-07-27 PROCEDURE — 99397 PER PM REEVAL EST PAT 65+ YR: CPT | Mod: 25

## 2021-07-27 NOTE — PHYSICAL EXAM
[No Acute Distress] : no acute distress [Well Nourished] : well nourished [Well Developed] : well developed [Normal Sclera/Conjunctiva] : normal sclera/conjunctiva [PERRL] : pupils equal round and reactive to light [No Respiratory Distress] : no respiratory distress  [No Accessory Muscle Use] : no accessory muscle use [Clear to Auscultation] : lungs were clear to auscultation bilaterally [Declined Breast Exam] : declined breast exam  [Declined Rectal Exam] : declined rectal exam [Normal Posterior Cervical Nodes] : no posterior cervical lymphadenopathy [Normal Anterior Cervical Nodes] : no anterior cervical lymphadenopathy [No Rash] : no rash [Coordination Grossly Intact] : coordination grossly intact [Normal Gait] : normal gait [Normal Affect] : the affect was normal [Person] : oriented to person [Place] : oriented to place [Appropriate] : appropriate [Normal Rate] : a normal rate [Normal Rhythm] : a normal rhythm [Normal Tone] : normal tone [Normal Volume] : normal volume [Normal] : normal throught processes [Normal Associations] :  no deficiency [Impaired judgment] : intact judgment [Impaired Insight] : intact insight [Delusions] : exhibited no delusions [Hallucinations] : exhibited no hallucinations [Obsessions] : denied obsessions [Preoccupation with Violence] : denied preoccupation with violent thoughts [Suicidal Ideation] : denied suicidal ideation [Suicidal Intent] : denied suicidal intent [Suicidal Plan] : denied suicidal plans [Homicidal Ideation] : denied homicidal ideation [Homicidal Intent] : denied homicidal intention [Homicidal Plan] : denied homicidal plans [de-identified] : central obesity [FreeTextEntry1] : ifobt [de-identified] : left knee ROM wdl, reports c/w pain- advised f/u with ortho as previously discussed [de-identified] : hyperpigmentation BLE at baseline, o/w denies concerns [de-identified] : PATIENT CONFUSED AT BASELINE A/W ADVANCING DEMENTIA, Patient's daughter utilized for translation and communicates for patient.

## 2021-07-27 NOTE — HEALTH RISK ASSESSMENT
[Good] : ~his/her~  mood as  good [No] : In the past 12 months have you used drugs other than those required for medical reasons? No [No falls in past year] : Patient reported no falls in the past year [0] : 2) Feeling down, depressed, or hopeless: Not at all (0) [PHQ-2 Negative - No further assessment needed] : PHQ-2 Negative - No further assessment needed [Patient declined Retinal Exam] : Patient declined Retinal Exam. [Patient declined mammogram] : Patient declined mammogram [Patient declined PAP Smear] : Patient declined PAP Smear [Patient declined bone density test] : Patient declined bone density test [Patient declined colonoscopy] : Patient declined colonoscopy [HIV test declined] : HIV test declined [Hepatitis C test declined] : Hepatitis C test declined [None] : None [With Family] : lives with family [Retired] : retired [] :  [Feels Safe at Home] : Feels safe at home [Independent] : doing laundry [Some assistance needed] : managing finances [Full assistance needed] : using transportation [Reports normal functional visual acuity (ie: able to read med bottle)] : Reports normal functional visual acuity [Smoke Detector] : smoke detector [Carbon Monoxide Detector] : carbon monoxide detector [Safety elements used in home] : safety elements used in home [Seat Belt] :  uses seat belt [Sunscreen] : uses sunscreen [Patient/Caregiver not ready to engage] : , patient/caregiver not ready to engage [Designated Healthcare Proxy] : Designated healthcare proxy [Name: ___] : Health Care Proxy's Name: [unfilled]  [Relationship: ___] : Relationship: [unfilled] [] : No [de-identified] : active, walking [de-identified] : well balanced [[facility]] : 10 [[id number]] : 0 [Change in mental status noted] : No change in mental status noted [Language] : denies difficulty with language [Sexually Active] : not sexually active [High Risk Behavior] : no high risk behavior [Reports changes in hearing] : Reports no changes in hearing [Reports changes in vision] : Reports no changes in vision [Reports changes in dental health] : Reports no changes in dental health [Guns at Home] : no guns at home [Travel to Developing Areas] : does not  travel to developing areas [TB Exposure] : is not being exposed to tuberculosis [Caregiver Concerns] : does not have caregiver concerns [de-identified] : family is supportive [AdvancecareDate] : 07/21

## 2021-07-27 NOTE — COUNSELING
[Fall prevention counseling provided] : Fall prevention counseling provided [Adequate lighting] : Adequate lighting [No throw rugs] : No throw rugs [Use proper foot wear] : Use proper foot wear [Use recommended devices] : Use recommended devices [Behavioral health counseling provided] : Behavioral health counseling provided [Sleep ___ hours/day] : Sleep [unfilled] hours/day [Engage in a relaxing activity] : Engage in a relaxing activity [Plan in advance] : Plan in advance [Potential consequences of obesity discussed] : Potential consequences of obesity discussed [Benefits of weight loss discussed] : Benefits of weight loss discussed [None] : None [Good understanding] : Patient has a good understanding of lifestyle changes and steps needed to achieve self management goal [de-identified] : Maintain balanced diet of whole grain, fruits and vegetables, lean meats, skinless poultry, fish, beans, soy products, eggs, nuts, and low fat dairy as per FDA dietary guidelines. \par Avoid high calorie/low nutrient foods. \par vegetables/fruits- at least 5 servings/day, \par whole grains- 100% whole grain and at least 3 grams fiber/day.\par reduce/eliminate saturated fat- less than 5% on nutrition labels (ex. stone, deli meat, hot dogs, fried foods, cookies, ice cream, chips, soft drinks, etc.)\par stay within your FDA recommended daily calorie needs or use the plate method to portion intake. \par Avoid fast food and limit eating out. When eating out choose healthy alternatives (ex. grilled, baked, steamed. low fat dressings. avoid french fries).\par DO NOT CONSUME FOODS YOU ARE ALLERGIC TO DESPITE DIETARY RECOMMENDATIONS.\par \par For more information you can visit www.Health.gov \par \par Incorporate regular physical activity most days of the week. \par Regular aerobic activity- moderate intensity, most days/wk, at least 30min/day- ex. brisk walk 2.5miles/hr, ballroom dancing, water aerobics, light yard work (raking/lawn mowing) actively playing basketball, biking 10miles/hr.\par Muscle strengthening and strength/resistance exercises 2-3days/wk- ex. free weights, resistance bands, your own weight (squats/pull-ups/push-ups).\par Neuro-motor exercises for balance/agility/coordination and flexibility exercises 2-3days/wk, 20-30min/day- ex. yoga and joseph-chi.\par Bone strengthening at least 30min/day, most days of the week- ex. weights, resistance bands, running, brisk walking, jumping rope, stair climbing, tennis.\par Decrease sedentary time. \par LISTEN TO YOUR BODY AND MODIFY ACTIVITY AS NEEDED- DO NOT OVEREXERT YOURSELF DURING PHYSICAL ACTIVITY DESPITE RECOMMENDATION.\par \par For more information you can visit www.Health.gov \par \par  you are a fall risk, falls can cause serious injury and complications- encourage slow positional changes, do not walk/run unattended, walk with assistive devices as appropriate, do not drive; patient should be driven if needed. notify/return to office if worsening/persistent symptoms or new onset complaints/concerns, in the event of any emergency or life threatening condition, go to the nearest emergency department and then notify office. \par \par extremity pain education-\par it is important to remain active, listen to the body and do activity as tolerated. application of ice or heat and warm showers/baths with stretching can help with pain relief. there are non-medicinal approaches to help pain relief as well such as massage or acupuncture, however, there is limited evidence to support the use of these alternative methods and you should discuss appropriateness with a medical professional before use. please seek emergency medical care and then notify our office in the event of new onset worsening pain, unrelieved with previous methods of pain relief, new onset fever, weakness, numbness, tingling, etc.

## 2021-07-27 NOTE — REVIEW OF SYSTEMS
[Confusion] : confusion [Memory Loss] : memory loss [Negative] : Heme/Lymph [Dizziness] : no dizziness [Fainting] : no fainting [Unsteady Walking] : no ataxia [FreeTextEntry9] : OA of right knee [de-identified] : forgetfulness/ confusion at baseline a/w advancing dementia, stable for now, waxes and wanes. HAs well managed

## 2021-07-27 NOTE — ASSESSMENT
[FreeTextEntry1] : preventative health\par annual wellness- 07/27/2022\par flu vaccine- needs,  Aug. 2021\par ophthalmology dilated eye exam- refused\par no longer complete colonoscopy, mammo, DEXA, or Pap\par pneumococcal vaccine 01/19/2022\par full BW today - "labs drawn in office" \par \par advancing Alzheimer dementia/ progressive memory loss/ headaches\par well managed\par on Memantine & Nortriptyline\par patient of neurology Dr. Alexander\par in no acute distress and offers no complaints \par next f/u Nov. 2021\par \par htn/ hld\par BP well managed\par on HCTZ, Labetalol, Losartan, Simvastatin\par patient of Dr. Dumont cardiology\par reports last seen 05/04/21, no changes to POC, rec'd for f/u in 6 months- obtaining reports\par cardiology managing BP meds\par renewal Simvastatin and low dose aspirin as requested\par in no acute distress and offers no complaints \par \par prediabetes\par recheck a1c\par continue lifestyle modification\par \par Low VitD\par value previously within range\par c/w OTC D3 as directed\par \par AR\par well managed\par on Allegra & fluticasone \par in no acute distress and offers no complaints \par \par OA of knee\par manages conservatively\par referral ortho\par scheduling apt. with Dr. Sosa\par

## 2021-07-27 NOTE — HISTORY OF PRESENT ILLNESS
[Patient Declined  Services] : - None: Patient declined  services [FreeTextEntry1] : complete annual exam [FreeTextEntry4] : daughter [TWNoteComboBox1] : Citizen of [place] [de-identified] : This is a 71y/o Lao speaking female pmhx Htn/ hld/ prediabetes/ Alzheimers dementia/ OA/ AR, here today for annual well check & medication refill. \par o/w in no acute distress, no other major concerns and reports feeling well. \par will evaluate and manage as appropriate. \par \par PATIENT REFUSED  PHONE R/T CONFUSION WITH USE and advancing dementia, Patient's daughter utilized for translation and communicates for patient.

## 2021-07-29 ENCOUNTER — APPOINTMENT (OUTPATIENT)
Dept: FAMILY MEDICINE | Facility: CLINIC | Age: 71
End: 2021-07-29
Payer: MEDICAID

## 2021-07-29 ENCOUNTER — RESULT CHARGE (OUTPATIENT)
Age: 71
End: 2021-07-29

## 2021-07-29 DIAGNOSIS — R80.9 PROTEINURIA, UNSPECIFIED: ICD-10-CM

## 2021-07-29 LAB
25(OH)D3 SERPL-MCNC: 47.7 NG/ML
ALBUMIN SERPL ELPH-MCNC: 4.5 G/DL
ALP BLD-CCNC: 67 U/L
ALT SERPL-CCNC: 12 U/L
ANION GAP SERPL CALC-SCNC: 10 MMOL/L
AST SERPL-CCNC: 20 U/L
BASOPHILS # BLD AUTO: 0.06 K/UL
BASOPHILS NFR BLD AUTO: 0.9 %
BILIRUB SERPL-MCNC: 0.2 MG/DL
BUN SERPL-MCNC: 25 MG/DL
CALCIUM SERPL-MCNC: 10 MG/DL
CHLORIDE SERPL-SCNC: 103 MMOL/L
CHOLEST SERPL-MCNC: 201 MG/DL
CO2 SERPL-SCNC: 27 MMOL/L
CREAT SERPL-MCNC: 1.27 MG/DL
EOSINOPHIL # BLD AUTO: 0.44 K/UL
EOSINOPHIL NFR BLD AUTO: 6.8 %
ESTIMATED AVERAGE GLUCOSE: 123 MG/DL
GLUCOSE SERPL-MCNC: 101 MG/DL
HBA1C MFR BLD HPLC: 5.9 %
HCT VFR BLD CALC: 37.4 %
HDLC SERPL-MCNC: 59 MG/DL
HGB BLD-MCNC: 12 G/DL
IMM GRANULOCYTES NFR BLD AUTO: 0.2 %
IRON SATN MFR SERPL: 29 %
IRON SERPL-MCNC: 86 UG/DL
LDLC SERPL CALC-MCNC: 115 MG/DL
LYMPHOCYTES # BLD AUTO: 2.1 K/UL
LYMPHOCYTES NFR BLD AUTO: 32.3 %
MAN DIFF?: NORMAL
MCHC RBC-ENTMCNC: 31.1 PG
MCHC RBC-ENTMCNC: 32.1 GM/DL
MCV RBC AUTO: 96.9 FL
MONOCYTES # BLD AUTO: 0.53 K/UL
MONOCYTES NFR BLD AUTO: 8.1 %
NEUTROPHILS # BLD AUTO: 3.37 K/UL
NEUTROPHILS NFR BLD AUTO: 51.7 %
NONHDLC SERPL-MCNC: 143 MG/DL
PLATELET # BLD AUTO: 186 K/UL
POTASSIUM SERPL-SCNC: 4.6 MMOL/L
PROT SERPL-MCNC: 7.4 G/DL
RBC # BLD: 3.86 M/UL
RBC # FLD: 13.2 %
SODIUM SERPL-SCNC: 140 MMOL/L
T4 FREE SERPL-MCNC: 1 NG/DL
TIBC SERPL-MCNC: 298 UG/DL
TRIGL SERPL-MCNC: 139 MG/DL
TSH SERPL-ACNC: 3.17 UIU/ML
UIBC SERPL-MCNC: 212 UG/DL
VIT B12 SERPL-MCNC: 449 PG/ML
WBC # FLD AUTO: 6.51 K/UL

## 2021-07-29 PROCEDURE — 36415 COLL VENOUS BLD VENIPUNCTURE: CPT

## 2021-07-29 PROCEDURE — 81003 URINALYSIS AUTO W/O SCOPE: CPT | Mod: QW

## 2021-08-03 PROBLEM — R80.9 PROTEINURIA: Status: ACTIVE | Noted: 2021-04-27

## 2021-12-06 ENCOUNTER — APPOINTMENT (OUTPATIENT)
Dept: NEUROLOGY | Facility: CLINIC | Age: 71
End: 2021-12-06
Payer: MEDICAID

## 2021-12-06 ENCOUNTER — RX RENEWAL (OUTPATIENT)
Age: 71
End: 2021-12-06

## 2021-12-06 VITALS
HEIGHT: 60 IN | BODY MASS INDEX: 33.38 KG/M2 | SYSTOLIC BLOOD PRESSURE: 138 MMHG | DIASTOLIC BLOOD PRESSURE: 70 MMHG | WEIGHT: 170 LBS

## 2021-12-06 VITALS
BODY MASS INDEX: 33.38 KG/M2 | SYSTOLIC BLOOD PRESSURE: 120 MMHG | HEIGHT: 60 IN | WEIGHT: 170 LBS | DIASTOLIC BLOOD PRESSURE: 78 MMHG

## 2021-12-06 PROCEDURE — 99214 OFFICE O/P EST MOD 30 MIN: CPT

## 2021-12-06 NOTE — ASSESSMENT
[FreeTextEntry1] : This is a 71 year-old woman with chronic headaches that have improved with nortriptyline.\par She is currently on 25 mg at bedtime.\par Imaging and sedimentation rate have been normal.\par \par She also has some mild cognitive impairment likely representing early Alzheimer's type dementia. I will continue Memantine 10 mg BID. \par \par She has been having intermittent tremors predominantly of her head.\par These were not visible today.\par Neurologic workup has been negative for this.\par As she is bothered by it, I have suggested a trial of primidone starting at 50 mg twice per day.\par \par I will see her back in 6 months.

## 2021-12-06 NOTE — HISTORY OF PRESENT ILLNESS
[FreeTextEntry1] : I saw this patient in the office today.\par \par As you recall she is a history of chronic headaches for many years. I had started her on nortriptyline. \par She had initially reported good improvement.\par She had an increase in her headaches and increased nortriptyline back to 25 mg at bedtime.\par The headaches are not bother her significantly at present.\par \par She also has some cognitive impairment likely representing early Alzheimer's type dementia. \par She had side effects on Aricept (palpitations). She has been on Namenda extended release 21 mg. She had side effects of 28 mg.\par She is now on generic Memantine 10 mg BID. \par \par She has had some tremors intermittently.\par There have been no findings of parkinsonism, and dopamine spectroscopy scan was normal.\par \par She now reports that the tremors of her head are bothering her significantly.\par \par

## 2021-12-06 NOTE — PHYSICAL EXAM
[General Appearance - Alert] : alert [General Appearance - In No Acute Distress] : in no acute distress [Oriented To Time, Place, And Person] : oriented to person, place, and time [Recall ___ / 3] : recall [unfilled] / 3 [Cranial Nerves Optic (II)] : visual acuity intact bilaterally,  visual fields full to confrontation, pupils equal round and reactive to light [Cranial Nerves Oculomotor (III)] : extraocular motion intact [Cranial Nerves Trigeminal (V)] : facial sensation intact symmetrically [Cranial Nerves Facial (VII)] : face symmetrical [Cranial Nerves Vestibulocochlear (VIII)] : hearing was intact bilaterally [Cranial Nerves Glossopharyngeal (IX)] : tongue and palate midline [Cranial Nerves Accessory (XI - Cranial And Spinal)] : head turning and shoulder shrug symmetric [Cranial Nerves Hypoglossal (XII)] : there was no tongue deviation with protrusion [Motor Tone] : muscle tone was normal in all four extremities [Motor Strength] : muscle strength was normal in all four extremities [Sensation Tactile Decrease] : light touch was intact [Sensation Pain / Temperature Decrease] : pain and temperature was intact [Sensation Vibration Decrease] : vibration was intact [Abnormal Walk] : normal gait [2+] : Patella left 2+ [Optic Disc Abnormality] : the optic disc were normal in size and color [Edema] : there was no peripheral edema [Involuntary Movements] : no involuntary movements were seen [FreeTextEntry1] : Short-term memory is impaired. Remote memory is relatively well preserved. [Dysarthria] : no dysarthria [Aphasia] : no dysphasia/aphasia [Romberg's Sign] : Romberg's sign was negtive [Tremor] : no tremor present [Coordination - Dysmetria Impaired Finger-to-Nose Bilateral] : not present [Plantar Reflex Right Only] : normal on the right [Plantar Reflex Left Only] : normal on the left

## 2021-12-06 NOTE — DATA REVIEWED
[de-identified] : Brain MRI was performed on 2/23/13. \par The study demonstrated only ischemic white matter change of a chronic nature. [de-identified] : EEG was normal. [de-identified] : Katya scan was normal.  [de-identified] : B12 level was normal, RPR was nonreactive.\par Sedimentation rate was 17.\par Thyroid function was normal.

## 2021-12-06 NOTE — REVIEW OF SYSTEMS
Anesthesiologist present for case.  See Anesthesia Record for details regarding sedation/anesthesia, medications, and vital signs.   [As Noted in HPI] : as noted in HPI [Negative] : Heme/Lymph

## 2022-01-25 ENCOUNTER — APPOINTMENT (OUTPATIENT)
Dept: FAMILY MEDICINE | Facility: CLINIC | Age: 72
End: 2022-01-25

## 2022-02-07 ENCOUNTER — APPOINTMENT (OUTPATIENT)
Dept: FAMILY MEDICINE | Facility: CLINIC | Age: 72
End: 2022-02-07
Payer: MEDICAID

## 2022-02-07 VITALS
WEIGHT: 176 LBS | OXYGEN SATURATION: 98 % | BODY MASS INDEX: 34.37 KG/M2 | HEART RATE: 82 BPM | TEMPERATURE: 96.5 F | RESPIRATION RATE: 14 BRPM

## 2022-02-07 VITALS — DIASTOLIC BLOOD PRESSURE: 70 MMHG | SYSTOLIC BLOOD PRESSURE: 135 MMHG

## 2022-02-07 PROCEDURE — 99214 OFFICE O/P EST MOD 30 MIN: CPT

## 2022-02-07 NOTE — ASSESSMENT
[FreeTextEntry1] : Visit notification dictation on this 71-year-old female who came in for refills of her prescriptions she is going to the [place] to 3 months he was on which medication she is taking which when she's not some of the medications that she wanted to know she hasn't taken in a year or 2 I'm giving her the prescription as I think he now and wrote [email or web address] on Wednesday improving oral medication she is taking and I will eliminate the ones that she did not bring in and give her once I might have if not given today she has a history of hypertension and high  cholesterol

## 2022-04-11 PROBLEM — Z11.59 SCREENING FOR VIRAL DISEASE: Status: RESOLVED | Noted: 2020-06-09 | Resolved: 2022-04-11

## 2022-05-24 ENCOUNTER — APPOINTMENT (OUTPATIENT)
Dept: INTERNAL MEDICINE | Facility: CLINIC | Age: 72
End: 2022-05-24
Payer: MEDICAID

## 2022-05-24 ENCOUNTER — APPOINTMENT (OUTPATIENT)
Dept: FAMILY MEDICINE | Facility: CLINIC | Age: 72
End: 2022-05-24

## 2022-05-24 VITALS
DIASTOLIC BLOOD PRESSURE: 81 MMHG | WEIGHT: 171 LBS | HEART RATE: 83 BPM | TEMPERATURE: 98 F | SYSTOLIC BLOOD PRESSURE: 182 MMHG | OXYGEN SATURATION: 97 % | BODY MASS INDEX: 33.4 KG/M2

## 2022-05-24 DIAGNOSIS — Z12.31 ENCOUNTER FOR SCREENING MAMMOGRAM FOR MALIGNANT NEOPLASM OF BREAST: ICD-10-CM

## 2022-05-24 PROCEDURE — 99213 OFFICE O/P EST LOW 20 MIN: CPT

## 2022-05-24 RX ORDER — HYDROCHLOROTHIAZIDE 12.5 MG/1
12.5 TABLET ORAL DAILY
Qty: 90 | Refills: 0 | Status: DISCONTINUED | COMMUNITY
Start: 2019-12-16 | End: 2022-05-24

## 2022-05-24 NOTE — PHYSICAL EXAM
[No Acute Distress] : no acute distress [Well Nourished] : well nourished [Well Developed] : well developed [Well-Appearing] : well-appearing [Normal Voice/Communication] : normal voice/communication [Normal Sclera/Conjunctiva] : normal sclera/conjunctiva [No Respiratory Distress] : no respiratory distress  [Clear to Auscultation] : lungs were clear to auscultation bilaterally [Normal Rate] : normal rate  [Normal S1, S2] : normal S1 and S2 [Speech Grossly Normal] : speech grossly normal [Normal Affect] : the affect was normal [Normal Mood] : the mood was normal

## 2022-05-24 NOTE — PLAN
[FreeTextEntry1] : 72 yo F for f/u HTN. BP elevated in office today. Asymptomatic. Did not take medication this am. Saw cardio just last week and BP noted to be ok. Will obtain visit notes. Pt to continue current regimen for now and will return to clinic as discussed for re-evaluation. Signs and symptoms warranting further eval discussed. All questions answered. Pt daughter voiced understanding and in agreement to plan.

## 2022-05-24 NOTE — REVIEW OF SYSTEMS
[Fever] : no fever [Discharge] : no discharge [Earache] : no earache [Chest Pain] : no chest pain [Shortness Of Breath] : no shortness of breath [Abdominal Pain] : no abdominal pain [Headache] : no headache

## 2022-05-24 NOTE — HISTORY OF PRESENT ILLNESS
[Family Member] : family member [FreeTextEntry1] : 3 months b/p check [de-identified] : 72 yo F for f/u HTN. BP elevated in office today. Pt did not take medication this am. Asymptomatic. Saw cardiologist last week and pt daughter notes new changes made. States BP was WNL there.

## 2022-05-31 ENCOUNTER — APPOINTMENT (OUTPATIENT)
Dept: INTERNAL MEDICINE | Facility: CLINIC | Age: 72
End: 2022-05-31
Payer: MEDICAID

## 2022-05-31 VITALS — DIASTOLIC BLOOD PRESSURE: 70 MMHG | SYSTOLIC BLOOD PRESSURE: 135 MMHG

## 2022-05-31 PROCEDURE — 99213 OFFICE O/P EST LOW 20 MIN: CPT

## 2022-05-31 NOTE — PLAN
[FreeTextEntry1] : 72 yo F for f/u HTN. BP WNL. Followed by cardio. Continue current therapy. Signs and symptoms warranting further eval discussed. All questions answered. Pt voiced understanding and in agreement to plan. RTC as recommended.

## 2022-05-31 NOTE — HISTORY OF PRESENT ILLNESS
[Family Member] : family member [FreeTextEntry1] : b/p check [de-identified] : 70 yo F f/u. Pt doing well. BP WNL. Compliant w/ therapy.

## 2022-06-06 ENCOUNTER — APPOINTMENT (OUTPATIENT)
Dept: NEUROLOGY | Facility: CLINIC | Age: 72
End: 2022-06-06
Payer: MEDICAID

## 2022-06-06 VITALS
DIASTOLIC BLOOD PRESSURE: 70 MMHG | SYSTOLIC BLOOD PRESSURE: 130 MMHG | HEIGHT: 60 IN | BODY MASS INDEX: 33.38 KG/M2 | WEIGHT: 170 LBS

## 2022-06-06 PROCEDURE — 99214 OFFICE O/P EST MOD 30 MIN: CPT

## 2022-06-06 RX ORDER — PRIMIDONE 50 MG/1
50 TABLET ORAL
Qty: 180 | Refills: 1 | Status: COMPLETED | COMMUNITY
Start: 2021-12-06 | End: 2022-06-06

## 2022-06-06 NOTE — DATA REVIEWED
[de-identified] : Brain MRI was performed on 2/23/13. \par The study demonstrated only ischemic white matter change of a chronic nature. [de-identified] : EEG was normal. [de-identified] : Katya scan was normal.  [de-identified] : B12 level was normal, RPR was nonreactive.\par Sedimentation rate was 17.\par Thyroid function was normal.

## 2022-06-06 NOTE — HISTORY OF PRESENT ILLNESS
[FreeTextEntry1] : I saw this patient in the office today.\par \par As you recall she is a history of chronic headaches for many years. I had started her on nortriptyline. \par She had initially reported good improvement.\par She had an increase in her headaches and increased nortriptyline back to 25 mg at bedtime.\par The headaches are not bother her significantly at present.\par \par She also has some cognitive impairment likely representing early Alzheimer's type dementia. \par She had side effects on Aricept (palpitations). She has been on Namenda extended release 21 mg. She had side effects of 28 mg.\par She is now on generic Memantine 10 mg BID. \par \par She has had some tremors intermittently.\par There have been no findings of parkinsonism, and dopamine spectroscopy scan was normal.\par \par She reported that the tremors of her head are bothering her significantly.\par I had tried her on a low-dose of primidone, however, she has had severe side effects of somnolence.\par

## 2022-06-06 NOTE — ASSESSMENT
[FreeTextEntry1] : This is a 71 year-old woman with chronic headaches that have improved with nortriptyline.\par She is currently on 25 mg at bedtime.\par Imaging and sedimentation rate have been normal.\par \par She also has some mild cognitive impairment likely representing early Alzheimer's type dementia. I will continue Memantine 10 mg BID. \par \par She has been having intermittent tremors predominantly of her head.\par These were not visible today.\par She had side effects from primidone.\par This will be discontinued.\par \par I will see her back in 6 months.

## 2022-06-06 NOTE — PHYSICAL EXAM
[General Appearance - Alert] : alert [General Appearance - In No Acute Distress] : in no acute distress [Oriented To Time, Place, And Person] : oriented to person, place, and time [Recall ___ / 3] : recall [unfilled] / 3 [Cranial Nerves Optic (II)] : visual acuity intact bilaterally,  visual fields full to confrontation, pupils equal round and reactive to light [Cranial Nerves Oculomotor (III)] : extraocular motion intact [Cranial Nerves Trigeminal (V)] : facial sensation intact symmetrically [Cranial Nerves Facial (VII)] : face symmetrical [Cranial Nerves Vestibulocochlear (VIII)] : hearing was intact bilaterally [Cranial Nerves Glossopharyngeal (IX)] : tongue and palate midline [Cranial Nerves Accessory (XI - Cranial And Spinal)] : head turning and shoulder shrug symmetric [Cranial Nerves Hypoglossal (XII)] : there was no tongue deviation with protrusion [Motor Tone] : muscle tone was normal in all four extremities [Motor Strength] : muscle strength was normal in all four extremities [Sensation Tactile Decrease] : light touch was intact [Sensation Pain / Temperature Decrease] : pain and temperature was intact [Sensation Vibration Decrease] : vibration was intact [Abnormal Walk] : normal gait [2+] : Patella left 2+ [Optic Disc Abnormality] : the optic disc were normal in size and color [Edema] : there was no peripheral edema [Involuntary Movements] : no involuntary movements were seen [FreeTextEntry1] : Short-term memory is impaired. Remote memory is relatively well preserved. [Dysarthria] : no dysarthria [Aphasia] : no dysphasia/aphasia [Romberg's Sign] : Romberg's sign was negtive [Tremor] : no tremor present [Coordination - Dysmetria Impaired Finger-to-Nose Bilateral] : not present [Plantar Reflex Right Only] : normal on the right [Plantar Reflex Left Only] : normal on the left [FreeTextEntry8] : There was no tremor seen today.  There is no cogwheeling at rest or with reinforcement.

## 2022-07-06 ENCOUNTER — NON-APPOINTMENT (OUTPATIENT)
Age: 72
End: 2022-07-06

## 2022-07-07 ENCOUNTER — NON-APPOINTMENT (OUTPATIENT)
Age: 72
End: 2022-07-07

## 2022-07-17 ENCOUNTER — NON-APPOINTMENT (OUTPATIENT)
Age: 72
End: 2022-07-17

## 2022-08-22 ENCOUNTER — APPOINTMENT (OUTPATIENT)
Dept: FAMILY MEDICINE | Facility: CLINIC | Age: 72
End: 2022-08-22

## 2022-08-22 VITALS
WEIGHT: 170 LBS | SYSTOLIC BLOOD PRESSURE: 134 MMHG | TEMPERATURE: 97.9 F | DIASTOLIC BLOOD PRESSURE: 82 MMHG | BODY MASS INDEX: 33.38 KG/M2 | RESPIRATION RATE: 14 BRPM | HEIGHT: 60 IN | HEART RATE: 73 BPM | OXYGEN SATURATION: 97 %

## 2022-08-22 PROCEDURE — 36415 COLL VENOUS BLD VENIPUNCTURE: CPT

## 2022-08-22 PROCEDURE — 99397 PER PM REEVAL EST PAT 65+ YR: CPT | Mod: 25

## 2022-08-22 NOTE — HISTORY OF PRESENT ILLNESS
[Family Member] : family member [FreeTextEntry1] : Patient is here for CPE  [de-identified] : 72 yo F for annual wellness visit. \par \par Hx of dementia. Following w/ neuro. Pt is not always oriented to time. Sometimes does not remember family member's names either. She is on Memantine 10 mg BID and nortriptyline 25 mg QHS. \par \par HTN/HLD. Followed by cardio. Stable. On Statin, Losartan & Labetalol. Takes daily aspirin as well.

## 2022-08-22 NOTE — PLAN
[FreeTextEntry1] : 72 yo F for annual wellness visit. \par \par Dementia. Followed by neuro. Cont current therapy. Pt lives with daughter. Does not travel on her own. \par \par HTN/HLD. Stable. Medication regimen per cardio.\par \par Vit D deficiency. On supplementation. \par \par Health Maintenance. Colonoscopy/Mammo/COVID vaccine UTD. PCV 13 received 2021. Will schedule for PPSV. \par \par All questions answered. Pt daughter voiced understanding and in agreement to plan. RTC as recommended.

## 2022-08-23 ENCOUNTER — NON-APPOINTMENT (OUTPATIENT)
Age: 72
End: 2022-08-23

## 2022-08-25 ENCOUNTER — NON-APPOINTMENT (OUTPATIENT)
Age: 72
End: 2022-08-25

## 2022-08-25 LAB
25(OH)D3 SERPL-MCNC: 55.6 NG/ML
ALBUMIN SERPL ELPH-MCNC: 4.4 G/DL
ALP BLD-CCNC: 69 U/L
ALT SERPL-CCNC: 12 U/L
ANION GAP SERPL CALC-SCNC: 10 MMOL/L
AST SERPL-CCNC: 17 U/L
BASOPHILS # BLD AUTO: 0.04 K/UL
BASOPHILS NFR BLD AUTO: 0.6 %
BILIRUB SERPL-MCNC: 0.3 MG/DL
BUN SERPL-MCNC: 23 MG/DL
CALCIUM SERPL-MCNC: 9.6 MG/DL
CHLORIDE SERPL-SCNC: 106 MMOL/L
CHOLEST SERPL-MCNC: 162 MG/DL
CO2 SERPL-SCNC: 27 MMOL/L
CREAT SERPL-MCNC: 1.41 MG/DL
EGFR: 40 ML/MIN/1.73M2
EOSINOPHIL # BLD AUTO: 0.17 K/UL
EOSINOPHIL NFR BLD AUTO: 2.7 %
ESTIMATED AVERAGE GLUCOSE: 128 MG/DL
GLUCOSE SERPL-MCNC: 110 MG/DL
HBA1C MFR BLD HPLC: 6.1 %
HCT VFR BLD CALC: 35.1 %
HDLC SERPL-MCNC: 59 MG/DL
HGB BLD-MCNC: 11 G/DL
IMM GRANULOCYTES NFR BLD AUTO: 0.2 %
LDLC SERPL CALC-MCNC: 81 MG/DL
LYMPHOCYTES # BLD AUTO: 1.58 K/UL
LYMPHOCYTES NFR BLD AUTO: 25.3 %
MAN DIFF?: NORMAL
MCHC RBC-ENTMCNC: 30.5 PG
MCHC RBC-ENTMCNC: 31.3 GM/DL
MCV RBC AUTO: 97.2 FL
MONOCYTES # BLD AUTO: 0.39 K/UL
MONOCYTES NFR BLD AUTO: 6.3 %
NEUTROPHILS # BLD AUTO: 4.05 K/UL
NEUTROPHILS NFR BLD AUTO: 64.9 %
NONHDLC SERPL-MCNC: 103 MG/DL
PLATELET # BLD AUTO: 198 K/UL
POTASSIUM SERPL-SCNC: 4.7 MMOL/L
PROT SERPL-MCNC: 7 G/DL
RBC # BLD: 3.61 M/UL
RBC # FLD: 13.5 %
SODIUM SERPL-SCNC: 142 MMOL/L
TRIGL SERPL-MCNC: 107 MG/DL
TSH SERPL-ACNC: 3.21 UIU/ML
WBC # FLD AUTO: 6.24 K/UL

## 2022-08-26 LAB
FERRITIN SERPL-MCNC: 124 NG/ML
IRON SATN MFR SERPL: 29 %
IRON SERPL-MCNC: 79 UG/DL
TIBC SERPL-MCNC: 275 UG/DL
UIBC SERPL-MCNC: 196 UG/DL

## 2022-08-29 ENCOUNTER — APPOINTMENT (OUTPATIENT)
Dept: FAMILY MEDICINE | Facility: CLINIC | Age: 72
End: 2022-08-29

## 2022-09-01 ENCOUNTER — APPOINTMENT (OUTPATIENT)
Dept: FAMILY MEDICINE | Facility: CLINIC | Age: 72
End: 2022-09-01

## 2022-09-01 PROCEDURE — 36415 COLL VENOUS BLD VENIPUNCTURE: CPT

## 2022-09-02 LAB
ALBUMIN SERPL ELPH-MCNC: 4.1 G/DL
ALP BLD-CCNC: 70 U/L
ALT SERPL-CCNC: 9 U/L
ANION GAP SERPL CALC-SCNC: 12 MMOL/L
AST SERPL-CCNC: 20 U/L
BILIRUB SERPL-MCNC: 0.2 MG/DL
BUN SERPL-MCNC: 26 MG/DL
CALCIUM SERPL-MCNC: 9.9 MG/DL
CHLORIDE SERPL-SCNC: 106 MMOL/L
CO2 SERPL-SCNC: 25 MMOL/L
CREAT SERPL-MCNC: 1.43 MG/DL
EGFR: 39 ML/MIN/1.73M2
GLUCOSE SERPL-MCNC: 103 MG/DL
POTASSIUM SERPL-SCNC: 4.6 MMOL/L
PROT SERPL-MCNC: 7 G/DL
SODIUM SERPL-SCNC: 142 MMOL/L

## 2022-09-05 ENCOUNTER — NON-APPOINTMENT (OUTPATIENT)
Age: 72
End: 2022-09-05

## 2022-09-06 ENCOUNTER — NON-APPOINTMENT (OUTPATIENT)
Age: 72
End: 2022-09-06

## 2022-12-06 ENCOUNTER — APPOINTMENT (OUTPATIENT)
Dept: FAMILY MEDICINE | Facility: CLINIC | Age: 72
End: 2022-12-06

## 2022-12-06 VITALS — WEIGHT: 170.8 LBS | BODY MASS INDEX: 33.36 KG/M2

## 2022-12-06 VITALS
SYSTOLIC BLOOD PRESSURE: 138 MMHG | DIASTOLIC BLOOD PRESSURE: 74 MMHG | OXYGEN SATURATION: 96 % | HEART RATE: 76 BPM | RESPIRATION RATE: 14 BRPM

## 2022-12-06 DIAGNOSIS — Z23 ENCOUNTER FOR IMMUNIZATION: ICD-10-CM

## 2022-12-06 DIAGNOSIS — D64.9 ANEMIA, UNSPECIFIED: ICD-10-CM

## 2022-12-06 DIAGNOSIS — R94.4 ABNORMAL RESULTS OF KIDNEY FUNCTION STUDIES: ICD-10-CM

## 2022-12-06 PROCEDURE — G0008: CPT

## 2022-12-06 PROCEDURE — 90662 IIV NO PRSV INCREASED AG IM: CPT

## 2022-12-06 PROCEDURE — 99214 OFFICE O/P EST MOD 30 MIN: CPT | Mod: 25

## 2022-12-06 RX ORDER — AMOXICILLIN 500 MG/1
500 TABLET, FILM COATED ORAL
Qty: 30 | Refills: 0 | Status: DISCONTINUED | COMMUNITY
Start: 2022-09-13

## 2022-12-06 RX ORDER — IBUPROFEN 800 MG/1
800 TABLET, FILM COATED ORAL
Qty: 20 | Refills: 0 | Status: DISCONTINUED | COMMUNITY
Start: 2022-07-19

## 2022-12-06 RX ORDER — CHLORHEXIDINE GLUCONATE, 0.12% ORAL RINSE 1.2 MG/ML
0.12 SOLUTION DENTAL
Qty: 473 | Refills: 0 | Status: DISCONTINUED | COMMUNITY
Start: 2022-07-20

## 2022-12-06 RX ORDER — ASPIRIN 81 MG/1
81 TABLET, COATED ORAL
Qty: 90 | Refills: 1 | Status: ACTIVE | COMMUNITY
Start: 2021-04-19 | End: 1900-01-01

## 2022-12-06 RX ORDER — HYDRALAZINE HYDROCHLORIDE 50 MG/1
50 TABLET ORAL
Qty: 180 | Refills: 0 | Status: DISCONTINUED | COMMUNITY
Start: 2022-03-15

## 2022-12-06 RX ORDER — MULTIVIT-MIN/FOLIC/VIT K/LYCOP 400-300MCG
50 MCG TABLET ORAL
Qty: 90 | Refills: 1 | Status: ACTIVE | COMMUNITY
Start: 2019-10-15 | End: 1900-01-01

## 2022-12-06 RX ORDER — AMOXICILLIN 500 MG/1
500 CAPSULE ORAL
Qty: 30 | Refills: 0 | Status: DISCONTINUED | COMMUNITY
Start: 2022-07-19

## 2022-12-06 NOTE — REVIEW OF SYSTEMS
[Fever] : no fever [Chills] : no chills [Chest Pain] : no chest pain [Palpitations] : no palpitations [Shortness Of Breath] : no shortness of breath [Wheezing] : no wheezing [Cough] : no cough [Abdominal Pain] : no abdominal pain [Headache] : no headache [Dizziness] : no dizziness

## 2022-12-06 NOTE — HISTORY OF PRESENT ILLNESS
[FreeTextEntry1] : Pt is here for check up on BP and BW cholesterol level for cardiologist [de-identified] : 73 y/o female with pmhx of dementia, hld, htn presents for follow up. Patient is accompanied by her daughter. Patient has overall been doing well. States she was told by the cardiologist to follow up with her pcp for repeat blood work.

## 2022-12-06 NOTE — ASSESSMENT
[FreeTextEntry1] : HTN:\par patient with BP of 118/58 at today's visit\par -will continue current medication regimen of  hydralazine 100mg BID, losartan 100mg daily, and labetalol 200mg BID\par -Patient advised to continue following a healthy meal plan with less salt and more fruits and vegetables, in addition to increasing physical activity.\par -will request records from cardiologist\par -f/u in 3 months\par \par Hx of anemia\par will check cbc, cmp ferritin and b12/folate levels\par denies active bleeding\par \par Dementia\par continue memantine and nortriptyline\par \par HLD:\par -continue simvastatin 20mg daily\par -will check lipid panel\par \par prediabetes\par will check [id number] today

## 2022-12-07 ENCOUNTER — NON-APPOINTMENT (OUTPATIENT)
Age: 72
End: 2022-12-07

## 2022-12-07 LAB
ALBUMIN SERPL ELPH-MCNC: 3.9 G/DL
ALP BLD-CCNC: 73 U/L
ALT SERPL-CCNC: 13 U/L
ANION GAP SERPL CALC-SCNC: 11 MMOL/L
AST SERPL-CCNC: 21 U/L
BASOPHILS # BLD AUTO: 0.04 K/UL
BASOPHILS NFR BLD AUTO: 0.8 %
BILIRUB SERPL-MCNC: 0.2 MG/DL
BUN SERPL-MCNC: 25 MG/DL
CALCIUM SERPL-MCNC: 9.7 MG/DL
CHLORIDE SERPL-SCNC: 103 MMOL/L
CHOLEST SERPL-MCNC: 160 MG/DL
CO2 SERPL-SCNC: 24 MMOL/L
CREAT SERPL-MCNC: 1.25 MG/DL
EGFR: 46 ML/MIN/1.73M2
EOSINOPHIL # BLD AUTO: 0.36 K/UL
EOSINOPHIL NFR BLD AUTO: 6.8 %
ESTIMATED AVERAGE GLUCOSE: 123 MG/DL
FERRITIN SERPL-MCNC: 132 NG/ML
FOLATE SERPL-MCNC: 8.1 NG/ML
GLUCOSE SERPL-MCNC: 108 MG/DL
HBA1C MFR BLD HPLC: 5.9 %
HCT VFR BLD CALC: 35.5 %
HDLC SERPL-MCNC: 56 MG/DL
HGB BLD-MCNC: 11.1 G/DL
IMM GRANULOCYTES NFR BLD AUTO: 0.2 %
LDLC SERPL CALC-MCNC: 80 MG/DL
LYMPHOCYTES # BLD AUTO: 1.77 K/UL
LYMPHOCYTES NFR BLD AUTO: 33.7 %
MAN DIFF?: NORMAL
MCHC RBC-ENTMCNC: 30.8 PG
MCHC RBC-ENTMCNC: 31.3 GM/DL
MCV RBC AUTO: 98.6 FL
MONOCYTES # BLD AUTO: 0.36 K/UL
MONOCYTES NFR BLD AUTO: 6.8 %
NEUTROPHILS # BLD AUTO: 2.72 K/UL
NEUTROPHILS NFR BLD AUTO: 51.7 %
NONHDLC SERPL-MCNC: 104 MG/DL
PLATELET # BLD AUTO: 209 K/UL
POTASSIUM SERPL-SCNC: 4.5 MMOL/L
PROT SERPL-MCNC: 6.7 G/DL
RBC # BLD: 3.6 M/UL
RBC # FLD: 13.1 %
SODIUM SERPL-SCNC: 138 MMOL/L
TRIGL SERPL-MCNC: 121 MG/DL
VIT B12 SERPL-MCNC: 767 PG/ML
WBC # FLD AUTO: 5.26 K/UL

## 2022-12-19 ENCOUNTER — APPOINTMENT (OUTPATIENT)
Dept: NEUROLOGY | Facility: CLINIC | Age: 72
End: 2022-12-19

## 2022-12-19 VITALS — DIASTOLIC BLOOD PRESSURE: 70 MMHG | SYSTOLIC BLOOD PRESSURE: 130 MMHG

## 2022-12-19 PROCEDURE — 99214 OFFICE O/P EST MOD 30 MIN: CPT

## 2022-12-19 NOTE — DATA REVIEWED
[de-identified] : Brain MRI was performed on 2/23/13. \par The study demonstrated only ischemic white matter change of a chronic nature. [de-identified] : EEG was normal. [de-identified] : Katya scan was normal.  [de-identified] : B12 level was normal, RPR was nonreactive.\par Sedimentation rate was 17.\par Thyroid function was normal.

## 2022-12-19 NOTE — ASSESSMENT
[FreeTextEntry1] : This is a 72 year-old woman with chronic headaches that have improved with nortriptyline.\par She is currently on 25 mg at bedtime.\par Imaging and sedimentation rate have been normal.\par \par She also has some mild cognitive impairment likely representing early Alzheimer's type dementia. \par I will continue Memantine 10 mg BID. \par \par She has been having intermittent tremors predominantly of her head.\par These were not visible today.\par They do not interfere with her activities.\par She had side effects from primidone.\par This was discontinued.\par She is already on a beta-blocker.\par \par I will see her back in 6 months.

## 2023-01-17 ENCOUNTER — APPOINTMENT (OUTPATIENT)
Dept: FAMILY MEDICINE | Facility: CLINIC | Age: 73
End: 2023-01-17
Payer: MEDICAID

## 2023-01-17 VITALS
DIASTOLIC BLOOD PRESSURE: 77 MMHG | HEART RATE: 84 BPM | TEMPERATURE: 98 F | OXYGEN SATURATION: 98 % | SYSTOLIC BLOOD PRESSURE: 158 MMHG

## 2023-01-17 PROCEDURE — 99213 OFFICE O/P EST LOW 20 MIN: CPT | Mod: 25

## 2023-01-17 NOTE — ASSESSMENT
[FreeTextEntry1] : Headache syndrome- possible migraine, will send rx for naproxen. BP elevated today, could be 2/2 pain. Given the nature of her pain as well as questionable jaw claudication, will check labs to rule out temporal arteritis. Neuro exam intact. Will follow up lab results and schedule close follow up. Hospital precautions given.

## 2023-01-17 NOTE — REVIEW OF SYSTEMS
[Earache] : earache [Headache] : headache [Negative] : Respiratory [Fever] : no fever [Chills] : no chills [Fatigue] : no fatigue [Pain] : no pain [Vision Problems] : no vision problems [Dizziness] : no dizziness [FreeTextEntry4] : jaw pain

## 2023-01-17 NOTE — HISTORY OF PRESENT ILLNESS
[FreeTextEntry8] : Letitia is a 73 yo [place] speaking F with hx of HTN, HLD, Prediabetes, Alzheimer disease, who presents for acute visit with chief complaint of left sided headache and left ear pain.  Patient is a poor historian, but states that 2 to 3 days ago, started with left-sided head pain in her temple region.  Pain is pulsating in nature without radiation.  She does not report any vision changes but notes some pain in her jaw.  She denies any upper respiratory symptoms including no congestion, rhinorrhea, nausea, vomiting.  She has been trying Tylenol with little relief.

## 2023-01-17 NOTE — PHYSICAL EXAM
[Well Nourished] : well nourished [Well Developed] : well developed [Normal Sclera/Conjunctiva] : normal sclera/conjunctiva [PERRL] : pupils equal round and reactive to light [EOMI] : extraocular movements intact [Normal Outer Ear/Nose] : the outer ears and nose were normal in appearance [Normal Oropharynx] : the oropharynx was normal [No JVD] : no jugular venous distention [No Respiratory Distress] : no respiratory distress  [No Accessory Muscle Use] : no accessory muscle use [Normal Rate] : normal rate  [Regular Rhythm] : with a regular rhythm [Coordination Grossly Intact] : coordination grossly intact [No Focal Deficits] : no focal deficits [Speech Grossly Normal] : speech grossly normal [de-identified] : tender to palpation at left temple

## 2023-01-18 ENCOUNTER — NON-APPOINTMENT (OUTPATIENT)
Age: 73
End: 2023-01-18

## 2023-01-18 LAB
ALBUMIN SERPL ELPH-MCNC: 4 G/DL
ALP BLD-CCNC: 74 U/L
ALT SERPL-CCNC: 12 U/L
ANION GAP SERPL CALC-SCNC: 9 MMOL/L
AST SERPL-CCNC: 17 U/L
BASOPHILS # BLD AUTO: 0.04 K/UL
BASOPHILS NFR BLD AUTO: 0.7 %
BILIRUB SERPL-MCNC: 0.2 MG/DL
BUN SERPL-MCNC: 25 MG/DL
CALCIUM SERPL-MCNC: 10.2 MG/DL
CHLORIDE SERPL-SCNC: 105 MMOL/L
CO2 SERPL-SCNC: 28 MMOL/L
CREAT SERPL-MCNC: 1.36 MG/DL
CRP SERPL-MCNC: <3 MG/L
EGFR: 41 ML/MIN/1.73M2
EOSINOPHIL # BLD AUTO: 0.23 K/UL
EOSINOPHIL NFR BLD AUTO: 3.8 %
GLUCOSE SERPL-MCNC: 106 MG/DL
HCT VFR BLD CALC: 34.2 %
HGB BLD-MCNC: 10.8 G/DL
IMM GRANULOCYTES NFR BLD AUTO: 0.3 %
LYMPHOCYTES # BLD AUTO: 1.45 K/UL
LYMPHOCYTES NFR BLD AUTO: 24 %
MAN DIFF?: NORMAL
MCHC RBC-ENTMCNC: 30.3 PG
MCHC RBC-ENTMCNC: 31.6 GM/DL
MCV RBC AUTO: 96.1 FL
MONOCYTES # BLD AUTO: 0.42 K/UL
MONOCYTES NFR BLD AUTO: 6.9 %
NEUTROPHILS # BLD AUTO: 3.89 K/UL
NEUTROPHILS NFR BLD AUTO: 64.3 %
PLATELET # BLD AUTO: 209 K/UL
POTASSIUM SERPL-SCNC: 4.6 MMOL/L
PROT SERPL-MCNC: 6.6 G/DL
RBC # BLD: 3.56 M/UL
RBC # FLD: 13.7 %
SODIUM SERPL-SCNC: 143 MMOL/L
WBC # FLD AUTO: 6.05 K/UL

## 2023-01-20 LAB — ERYTHROCYTE [SEDIMENTATION RATE] IN BLOOD BY WESTERGREN METHOD: 56 MM/HR

## 2023-01-23 ENCOUNTER — NON-APPOINTMENT (OUTPATIENT)
Age: 73
End: 2023-01-23

## 2023-02-02 ENCOUNTER — APPOINTMENT (OUTPATIENT)
Dept: FAMILY MEDICINE | Facility: CLINIC | Age: 73
End: 2023-02-02
Payer: MEDICAID

## 2023-02-02 VITALS
OXYGEN SATURATION: 98 % | WEIGHT: 172 LBS | HEART RATE: 92 BPM | RESPIRATION RATE: 14 BRPM | SYSTOLIC BLOOD PRESSURE: 159 MMHG | HEIGHT: 60 IN | TEMPERATURE: 97.2 F | BODY MASS INDEX: 33.77 KG/M2 | DIASTOLIC BLOOD PRESSURE: 77 MMHG

## 2023-02-02 VITALS — SYSTOLIC BLOOD PRESSURE: 140 MMHG | DIASTOLIC BLOOD PRESSURE: 78 MMHG

## 2023-02-02 DIAGNOSIS — R10.13 EPIGASTRIC PAIN: ICD-10-CM

## 2023-02-02 PROCEDURE — 99214 OFFICE O/P EST MOD 30 MIN: CPT

## 2023-02-02 NOTE — ASSESSMENT
[FreeTextEntry1] : Epigastric pain-no signs of acute abdomen on exam.  Discussed conservative treatment, trying bland/brat diet, return directions given.\par \par Hypertension-repeat blood pressure acceptable, continue current regimen\par \par Headache syndrome-headache has resolved.  She does have a neurologist and instructed to follow-up if symptoms return\par \par

## 2023-02-02 NOTE — REVIEW OF SYSTEMS
[Abdominal Pain] : abdominal pain [Negative] : Neurological [Nausea] : no nausea [Constipation] : no constipation [Diarrhea] : diarrhea [Vomiting] : no vomiting [Heartburn] : no heartburn [Melena] : no melena

## 2023-02-02 NOTE — HISTORY OF PRESENT ILLNESS
[Family Member] : family member [FreeTextEntry1] : pt. here for recheck for her ear also she has stomach pain [de-identified] : Letitia presents today for follow-up of headache.  She is accompanied today by her daughter.  She states that the headache has resolved but that she reports 4 days of abdominal discomfort.  She denies any change in food but does note some decreased appetite.  She reports feeling gassy and having some flatulence and feels better after passing gas.  She has had slightly looser bowel movements but normal frequency.  Nonbloody stools.  Denies any fever, chills, nausea, or vomiting.

## 2023-03-07 ENCOUNTER — APPOINTMENT (OUTPATIENT)
Dept: FAMILY MEDICINE | Facility: CLINIC | Age: 73
End: 2023-03-07

## 2023-03-14 ENCOUNTER — TRANSCRIPTION ENCOUNTER (OUTPATIENT)
Age: 73
End: 2023-03-14

## 2023-06-13 ENCOUNTER — RX RENEWAL (OUTPATIENT)
Age: 73
End: 2023-06-13

## 2023-06-19 ENCOUNTER — APPOINTMENT (OUTPATIENT)
Dept: NEUROLOGY | Facility: CLINIC | Age: 73
End: 2023-06-19
Payer: MEDICAID

## 2023-06-19 VITALS
DIASTOLIC BLOOD PRESSURE: 60 MMHG | SYSTOLIC BLOOD PRESSURE: 118 MMHG | BODY MASS INDEX: 33.38 KG/M2 | WEIGHT: 170 LBS | HEIGHT: 60 IN

## 2023-06-19 PROCEDURE — 99214 OFFICE O/P EST MOD 30 MIN: CPT

## 2023-06-19 NOTE — ASSESSMENT
[FreeTextEntry1] : This is a 72 year-old woman with chronic headaches that have improved with nortriptyline.\par She is currently on 25 mg at bedtime.\par She will continue ibuprofen as needed.\par \par She also has some mild cognitive impairment likely representing early Alzheimer's type dementia. \par I will continue Memantine 10 mg BID. \par \par She has been having intermittent tremors predominantly of her head.\par These were not visible today.\par They do not interfere with her activities.\par She had side effects from primidone.\par This was discontinued.\par She is already on a beta-blocker.\par \par I will see her back in 6 months.

## 2023-06-19 NOTE — PHYSICAL EXAM
[General Appearance - Alert] : alert [General Appearance - In No Acute Distress] : in no acute distress [Oriented To Time, Place, And Person] : oriented to person, place, and time [Recall ___ / 3] : recall [unfilled] / 3 [Cranial Nerves Optic (II)] : visual acuity intact bilaterally,  visual fields full to confrontation, pupils equal round and reactive to light [Cranial Nerves Oculomotor (III)] : extraocular motion intact [Cranial Nerves Trigeminal (V)] : facial sensation intact symmetrically [Cranial Nerves Facial (VII)] : face symmetrical [Cranial Nerves Vestibulocochlear (VIII)] : hearing was intact bilaterally [Cranial Nerves Glossopharyngeal (IX)] : tongue and palate midline [Cranial Nerves Accessory (XI - Cranial And Spinal)] : head turning and shoulder shrug symmetric [Cranial Nerves Hypoglossal (XII)] : there was no tongue deviation with protrusion [Motor Tone] : muscle tone was normal in all four extremities [Motor Strength] : muscle strength was normal in all four extremities [Sensation Tactile Decrease] : light touch was intact [Sensation Pain / Temperature Decrease] : pain and temperature was intact [Sensation Vibration Decrease] : vibration was intact [Abnormal Walk] : normal gait [2+] : Patella left 2+ [Optic Disc Abnormality] : the optic disc were normal in size and color [Edema] : there was no peripheral edema [Involuntary Movements] : no involuntary movements were seen [Dysarthria] : no dysarthria [Aphasia] : no dysphasia/aphasia [Romberg's Sign] : Romberg's sign was negtive [Tremor] : no tremor present [Coordination - Dysmetria Impaired Finger-to-Nose Bilateral] : not present [Plantar Reflex Right Only] : normal on the right [Plantar Reflex Left Only] : normal on the left [FreeTextEntry8] : There was no tremor seen today.  There is no cogwheeling at rest or with reinforcement. [FreeTextEntry1] : There is no tenderness over the temporal arteries.

## 2023-06-19 NOTE — HISTORY OF PRESENT ILLNESS
[FreeTextEntry1] : I saw this patient in the office today.\par \par As you recall she is a history of chronic headaches for many years. I had started her on nortriptyline. \par She had initially reported good improvement.\par She had an increase in her headaches and increased nortriptyline back to 25 mg at bedtime.\par \par She also has some cognitive impairment likely representing early Alzheimer's type dementia. \par She had side effects on Aricept (palpitations). She has been on Namenda extended release 21 mg. She had side effects of 28 mg.\par She is now on generic Memantine 10 mg BID. \par \par She has had some tremors intermittently.\par There have been no findings of parkinsonism, and dopamine spectroscopy scan was normal.\par \par She reported that the tremors of her head are bothering her significantly.\par I had tried her on a low-dose of primidone, however, she has had severe side effects of somnolence.\par \par 6/19/2023 visit:\par The headaches are now bothering her about twice per week.\par She uses ibuprofen as needed.\par The tremors do not bother her significantly at present.

## 2023-06-19 NOTE — CONSULT LETTER
[Courtesy Letter:] : I had the pleasure of seeing your patient, [unfilled], in my office today. [Please see my note below.] : Please see my note below. [Consult Closing:] : Thank you very much for allowing me to participate in the care of this patient.  If you have any questions, please do not hesitate to contact me. [Sincerely,] : Sincerely, [Dear  ___] : Dear  [unfilled], [FreeTextEntry3] : Richard Alexander MD.

## 2023-06-19 NOTE — DATA REVIEWED
[de-identified] : Brain MRI was performed on 2/23/13. \par The study demonstrated only ischemic white matter change of a chronic nature. [de-identified] : EEG was normal. [de-identified] : Katya scan was normal.  [de-identified] : B12 level was normal, RPR was nonreactive.\par Thyroid function was normal.\par \par ESR in January 2023 was 56.\par C-reactive protein was less than 3.

## 2023-07-07 ENCOUNTER — RX RENEWAL (OUTPATIENT)
Age: 73
End: 2023-07-07

## 2023-08-01 ENCOUNTER — APPOINTMENT (OUTPATIENT)
Dept: FAMILY MEDICINE | Facility: CLINIC | Age: 73
End: 2023-08-01
Payer: MEDICAID

## 2023-08-01 VITALS
HEART RATE: 71 BPM | DIASTOLIC BLOOD PRESSURE: 78 MMHG | HEIGHT: 60 IN | WEIGHT: 168 LBS | OXYGEN SATURATION: 96 % | TEMPERATURE: 96.8 F | SYSTOLIC BLOOD PRESSURE: 131 MMHG | BODY MASS INDEX: 32.98 KG/M2

## 2023-08-01 PROCEDURE — 99214 OFFICE O/P EST MOD 30 MIN: CPT | Mod: 25

## 2023-08-01 NOTE — HISTORY OF PRESENT ILLNESS
[Family Member] : family member [FreeTextEntry1] : follow up [de-identified] : Letitia is a 72-year-old female with hypertension, hyperlipidemia, prediabetes, Alzheimer disease who presents today for follow-up.  For hypertension, she is maintained on hydralazine 100 mg twice daily, labetalol 200 mg twice daily, losartan 100 mg daily.  She takes simvastatin 20 mg daily.  She has been following with neurology and was started on nortriptyline 25 mg daily and is maintained on memantine 10 mg.  Her only complaint today is some left knee pain for which she is following with orthopedics.  Received a cortisone injection about a year ago and did find it helpful.  She is requesting repeat mammogram order.

## 2023-08-01 NOTE — ASSESSMENT
[FreeTextEntry1] : Hypertension-well-controlled, continue current regimen of hydralazine 100 mg twice daily, labetalol 200 mg twice daily, losartan 100 mg daily  Hyperlipidemia-continue simvastatin 20 mg daily.  Repeat lipid panel today  PreDM- repeat A1C today, continue healthy lifestyle modifications  HCM-  mammogram order given Initially agreeable to prevnar 20 but she would like to confirm insurance coverage.  RTC in 1 months for CPE

## 2023-08-03 LAB
25(OH)D3 SERPL-MCNC: 41 NG/ML
ALBUMIN SERPL ELPH-MCNC: 4.2 G/DL
ALP BLD-CCNC: 71 U/L
ALT SERPL-CCNC: 10 U/L
ANION GAP SERPL CALC-SCNC: 11 MMOL/L
AST SERPL-CCNC: 19 U/L
BILIRUB SERPL-MCNC: 0.2 MG/DL
BUN SERPL-MCNC: 29 MG/DL
CALCIUM SERPL-MCNC: 9.9 MG/DL
CHLORIDE SERPL-SCNC: 104 MMOL/L
CHOLEST SERPL-MCNC: 191 MG/DL
CO2 SERPL-SCNC: 25 MMOL/L
CREAT SERPL-MCNC: 1.47 MG/DL
EGFR: 37 ML/MIN/1.73M2
ESTIMATED AVERAGE GLUCOSE: 123 MG/DL
GLUCOSE SERPL-MCNC: 105 MG/DL
HBA1C MFR BLD HPLC: 5.9 %
HDLC SERPL-MCNC: 64 MG/DL
LDLC SERPL CALC-MCNC: 110 MG/DL
NONHDLC SERPL-MCNC: 127 MG/DL
POTASSIUM SERPL-SCNC: 4.4 MMOL/L
PROT SERPL-MCNC: 6.8 G/DL
SODIUM SERPL-SCNC: 140 MMOL/L
TRIGL SERPL-MCNC: 93 MG/DL
TSH SERPL-ACNC: 2.41 UIU/ML

## 2023-09-12 ENCOUNTER — APPOINTMENT (OUTPATIENT)
Dept: FAMILY MEDICINE | Facility: CLINIC | Age: 73
End: 2023-09-12
Payer: MEDICAID

## 2023-09-12 VITALS
TEMPERATURE: 97.3 F | DIASTOLIC BLOOD PRESSURE: 68 MMHG | HEART RATE: 76 BPM | HEIGHT: 60 IN | SYSTOLIC BLOOD PRESSURE: 108 MMHG | OXYGEN SATURATION: 94 % | WEIGHT: 164 LBS | BODY MASS INDEX: 32.2 KG/M2 | RESPIRATION RATE: 14 BRPM

## 2023-09-12 DIAGNOSIS — M25.562 PAIN IN LEFT KNEE: ICD-10-CM

## 2023-09-12 DIAGNOSIS — I10 ESSENTIAL (PRIMARY) HYPERTENSION: ICD-10-CM

## 2023-09-12 PROCEDURE — 90662 IIV NO PRSV INCREASED AG IM: CPT

## 2023-09-12 PROCEDURE — G0008: CPT

## 2023-09-12 PROCEDURE — G0444 DEPRESSION SCREEN ANNUAL: CPT | Mod: 59

## 2023-09-12 PROCEDURE — 99214 OFFICE O/P EST MOD 30 MIN: CPT | Mod: 25

## 2023-09-25 ENCOUNTER — TRANSCRIPTION ENCOUNTER (OUTPATIENT)
Age: 73
End: 2023-09-25

## 2024-01-02 ENCOUNTER — RX RENEWAL (OUTPATIENT)
Age: 74
End: 2024-01-02

## 2024-01-09 ENCOUNTER — APPOINTMENT (OUTPATIENT)
Dept: RHEUMATOLOGY | Facility: CLINIC | Age: 74
End: 2024-01-09
Payer: MEDICAID

## 2024-01-09 VITALS
WEIGHT: 170 LBS | OXYGEN SATURATION: 97 % | HEIGHT: 60 IN | TEMPERATURE: 97.9 F | SYSTOLIC BLOOD PRESSURE: 140 MMHG | HEART RATE: 78 BPM | BODY MASS INDEX: 33.38 KG/M2 | DIASTOLIC BLOOD PRESSURE: 74 MMHG | RESPIRATION RATE: 17 BRPM

## 2024-01-09 DIAGNOSIS — Z86.39 PERSONAL HISTORY OF OTHER ENDOCRINE, NUTRITIONAL AND METABOLIC DISEASE: ICD-10-CM

## 2024-01-09 PROCEDURE — 99204 OFFICE O/P NEW MOD 45 MIN: CPT

## 2024-01-09 RX ORDER — NAPROXEN 250 MG/1
250 TABLET ORAL 4 TIMES DAILY
Qty: 56 | Refills: 0 | Status: DISCONTINUED | COMMUNITY
Start: 2023-01-17 | End: 2024-01-09

## 2024-01-10 LAB
ALBUMIN SERPL ELPH-MCNC: 3.9 G/DL
ALP BLD-CCNC: 77 U/L
ALT SERPL-CCNC: 11 U/L
ANION GAP SERPL CALC-SCNC: 10 MMOL/L
AST SERPL-CCNC: 19 U/L
BASOPHILS # BLD AUTO: 0.04 K/UL
BASOPHILS NFR BLD AUTO: 0.6 %
BILIRUB SERPL-MCNC: 0.2 MG/DL
BUN SERPL-MCNC: 24 MG/DL
CALCIUM SERPL-MCNC: 9.3 MG/DL
CHLORIDE SERPL-SCNC: 103 MMOL/L
CK SERPL-CCNC: 99 U/L
CO2 SERPL-SCNC: 26 MMOL/L
CREAT SERPL-MCNC: 1.34 MG/DL
CRP SERPL-MCNC: 4 MG/L
EGFR: 42 ML/MIN/1.73M2
EOSINOPHIL # BLD AUTO: 0.23 K/UL
EOSINOPHIL NFR BLD AUTO: 3.5 %
ERYTHROCYTE [SEDIMENTATION RATE] IN BLOOD BY WESTERGREN METHOD: 72 MM/HR
GLUCOSE SERPL-MCNC: 157 MG/DL
HCT VFR BLD CALC: 34.4 %
HGB BLD-MCNC: 10.8 G/DL
IMM GRANULOCYTES NFR BLD AUTO: 0.3 %
LYMPHOCYTES # BLD AUTO: 1.45 K/UL
LYMPHOCYTES NFR BLD AUTO: 22 %
MAN DIFF?: NORMAL
MCHC RBC-ENTMCNC: 30.4 PG
MCHC RBC-ENTMCNC: 31.4 GM/DL
MCV RBC AUTO: 96.9 FL
MONOCYTES # BLD AUTO: 0.44 K/UL
MONOCYTES NFR BLD AUTO: 6.7 %
NEUTROPHILS # BLD AUTO: 4.41 K/UL
NEUTROPHILS NFR BLD AUTO: 66.9 %
PLATELET # BLD AUTO: 203 K/UL
POTASSIUM SERPL-SCNC: 4.3 MMOL/L
PROT SERPL-MCNC: 6.5 G/DL
RBC # BLD: 3.55 M/UL
RBC # FLD: 13.4 %
RHEUMATOID FACT SER QL: <10 IU/ML
SODIUM SERPL-SCNC: 139 MMOL/L
TSH SERPL-ACNC: 2.55 UIU/ML
WBC # FLD AUTO: 6.59 K/UL

## 2024-01-16 ENCOUNTER — APPOINTMENT (OUTPATIENT)
Dept: NEUROLOGY | Facility: CLINIC | Age: 74
End: 2024-01-16
Payer: MEDICAID

## 2024-01-16 VITALS — DIASTOLIC BLOOD PRESSURE: 60 MMHG | SYSTOLIC BLOOD PRESSURE: 118 MMHG

## 2024-01-16 DIAGNOSIS — R25.1 TREMOR, UNSPECIFIED: ICD-10-CM

## 2024-01-16 DIAGNOSIS — F02.80 ALZHEIMER'S DISEASE, UNSPECIFIED: ICD-10-CM

## 2024-01-16 DIAGNOSIS — G30.9 ALZHEIMER'S DISEASE, UNSPECIFIED: ICD-10-CM

## 2024-01-16 DIAGNOSIS — G44.89 OTHER HEADACHE SYNDROME: ICD-10-CM

## 2024-01-16 PROCEDURE — 99214 OFFICE O/P EST MOD 30 MIN: CPT

## 2024-01-16 RX ORDER — MEMANTINE HYDROCHLORIDE 10 MG/1
10 TABLET, FILM COATED ORAL
Qty: 180 | Refills: 1 | Status: ACTIVE | COMMUNITY
Start: 2020-10-21 | End: 1900-01-01

## 2024-01-16 NOTE — ASSESSMENT
[FreeTextEntry1] : This is a 73-year-old woman with chronic headaches that have improved with nortriptyline. She is currently on 25 mg at bedtime. She will continue ibuprofen as needed.  She also has some cognitive impairment likely representing early Alzheimer's type dementia. I will continue Memantine 10 mg BID.  She has been having intermittent tremors predominantly of her head. These were not visible today. They do not interfere with her activities. She had side effects from primidone. This was discontinued. She is already on a beta-blocker.  I will see her back in 6 months.

## 2024-01-16 NOTE — HISTORY OF PRESENT ILLNESS
[FreeTextEntry1] : I saw this patient in the office today.  As you recall she is a history of chronic headaches for many years. I had started her on nortriptyline.  She had initially reported good improvement. She had an increase in her headaches and increased nortriptyline back to 25 mg at bedtime.  She also has some cognitive impairment likely representing early Alzheimer's type dementia.  She had side effects on Aricept (palpitations). She has been on Namenda extended release 21 mg. She had side effects of 28 mg. She is now on generic Memantine 10 mg BID.   She has had some tremors intermittently. There have been no findings of parkinsonism, and dopamine spectroscopy scan was normal.  She reported that the tremors of her head are bothering her significantly. I had tried her on a low dose of primidone; however, she has had severe side effects of somnolence.  6/19/2023 visit: The headaches are now bothering her about twice per week. She uses ibuprofen as needed. The tremors do not bother her significantly at present.  1/16/2024 visit: She reports that the headaches only occur occasionally. Tremors are not bothering her significantly.

## 2024-01-16 NOTE — DATA REVIEWED
[de-identified] : Brain MRI was performed on 2/23/13. \par  The study demonstrated only ischemic white matter change of a chronic nature. [de-identified] : EEG was normal. [de-identified] : Katya scan was normal.  [de-identified] : B12 level was normal, RPR was nonreactive.\par  Thyroid function was normal.\par  \par  ESR in January 2023 was 56.\par  C-reactive protein was less than 3.

## 2024-01-16 NOTE — PHYSICAL EXAM
[General Appearance - Alert] : alert [General Appearance - In No Acute Distress] : in no acute distress [Oriented To Time, Place, And Person] : oriented to person, place, and time [Dysarthria] : no dysarthria [Aphasia] : no dysphasia/aphasia [Recall ___ / 3] : recall [unfilled] / 3 [Cranial Nerves Optic (II)] : visual acuity intact bilaterally,  visual fields full to confrontation, pupils equal round and reactive to light [Cranial Nerves Oculomotor (III)] : extraocular motion intact [Cranial Nerves Trigeminal (V)] : facial sensation intact symmetrically [Cranial Nerves Facial (VII)] : face symmetrical [Cranial Nerves Vestibulocochlear (VIII)] : hearing was intact bilaterally [Cranial Nerves Glossopharyngeal (IX)] : tongue and palate midline [Cranial Nerves Accessory (XI - Cranial And Spinal)] : head turning and shoulder shrug symmetric [Cranial Nerves Hypoglossal (XII)] : there was no tongue deviation with protrusion [Motor Tone] : muscle tone was normal in all four extremities [Motor Strength] : muscle strength was normal in all four extremities [Sensation Pain / Temperature Decrease] : pain and temperature was intact [Sensation Tactile Decrease] : light touch was intact [Sensation Vibration Decrease] : vibration was intact [Romberg's Sign] : Romberg's sign was negtive [Abnormal Walk] : normal gait [Tremor] : no tremor present [Coordination - Dysmetria Impaired Finger-to-Nose Bilateral] : not present [2+] : Patella left 2+ [Plantar Reflex Right Only] : normal on the right [Plantar Reflex Left Only] : normal on the left [FreeTextEntry8] : There was no tremor seen today.  There is no cogwheeling at rest or with reinforcement. [Optic Disc Abnormality] : the optic disc were normal in size and color [Edema] : there was no peripheral edema [FreeTextEntry1] : There is no tenderness over the temporal arteries. [Involuntary Movements] : no involuntary movements were seen

## 2024-01-17 LAB
CCP AB SER IA-ACNC: <8 UNITS
RF+CCP IGG SER-IMP: NEGATIVE

## 2024-01-23 ENCOUNTER — APPOINTMENT (OUTPATIENT)
Dept: RHEUMATOLOGY | Facility: CLINIC | Age: 74
End: 2024-01-23
Payer: MEDICAID

## 2024-01-23 VITALS
BODY MASS INDEX: 33.38 KG/M2 | HEIGHT: 60 IN | OXYGEN SATURATION: 94 % | RESPIRATION RATE: 17 BRPM | HEART RATE: 83 BPM | TEMPERATURE: 98.1 F | DIASTOLIC BLOOD PRESSURE: 80 MMHG | WEIGHT: 170 LBS | SYSTOLIC BLOOD PRESSURE: 160 MMHG

## 2024-01-23 DIAGNOSIS — M25.50 PAIN IN UNSPECIFIED JOINT: ICD-10-CM

## 2024-01-23 DIAGNOSIS — M15.9 POLYOSTEOARTHRITIS, UNSPECIFIED: ICD-10-CM

## 2024-01-23 PROCEDURE — 99214 OFFICE O/P EST MOD 30 MIN: CPT

## 2024-01-23 NOTE — HISTORY OF PRESENT ILLNESS
[FreeTextEntry1] : Pt presenting today for a f.u visit for joint pain. Has OA/ degenerative disease.  Last seen 01/2024. Chart/ labs reviewed with pt and daughter.  Today reports feeling okay. Has very mild pain to low back and left knee today.  ROS otherwise unchanged from LV.

## 2024-01-23 NOTE — REASON FOR VISIT
[Follow-Up: _____] : a [unfilled] follow-up visit [Family Member] : family member [[employer] ] : provided by [employer]   [Interpreters_IDNumber] : [id number] [Interpreters_FullName] : Yuliet [[id number]] : [place]

## 2024-01-23 NOTE — PHYSICAL EXAM
[General Appearance - Alert] : alert [General Appearance - In No Acute Distress] : in no acute distress [General Appearance - Well Developed] : well developed [General Appearance - Well Nourished] : well nourished [Sclera] : the sclera and conjunctiva were normal [Outer Ear] : the ears and nose were normal in appearance [Neck Appearance] : the appearance of the neck was normal [Musculoskeletal - Swelling] : no joint swelling seen [] : no rash [No Focal Deficits] : no focal deficits [Oriented To Time, Place, And Person] : oriented to person, place, and time [FreeTextEntry1] : ++BL knee crepitus

## 2024-01-23 NOTE — ASSESSMENT
[FreeTextEntry1] : Symptoms likely from OA/ degenerative disease  - Tylenol prn for pain (reviewed risk and benefits of medications) - OTC topical analgesics - encouraged exercise and weight loss  Discussed treatment plan with the patient. The patient was given the opportunity to ask questions and all questions were answered to their satisfaction.

## 2024-03-13 ENCOUNTER — APPOINTMENT (OUTPATIENT)
Dept: FAMILY MEDICINE | Facility: CLINIC | Age: 74
End: 2024-03-13

## 2024-05-20 ENCOUNTER — APPOINTMENT (OUTPATIENT)
Dept: RHEUMATOLOGY | Facility: CLINIC | Age: 74
End: 2024-05-20

## 2024-06-04 ENCOUNTER — APPOINTMENT (OUTPATIENT)
Dept: INTERNAL MEDICINE | Facility: CLINIC | Age: 74
End: 2024-06-04
Payer: MEDICAID

## 2024-06-04 ENCOUNTER — NON-APPOINTMENT (OUTPATIENT)
Age: 74
End: 2024-06-04

## 2024-06-04 ENCOUNTER — APPOINTMENT (OUTPATIENT)
Dept: INTERNAL MEDICINE | Facility: CLINIC | Age: 74
End: 2024-06-04

## 2024-06-04 VITALS
HEIGHT: 60 IN | DIASTOLIC BLOOD PRESSURE: 78 MMHG | SYSTOLIC BLOOD PRESSURE: 145 MMHG | WEIGHT: 172 LBS | BODY MASS INDEX: 33.77 KG/M2 | OXYGEN SATURATION: 98 % | HEART RATE: 86 BPM

## 2024-06-04 DIAGNOSIS — Z00.00 ENCOUNTER FOR GENERAL ADULT MEDICAL EXAMINATION W/OUT ABNORMAL FINDINGS: ICD-10-CM

## 2024-06-04 DIAGNOSIS — E78.5 HYPERLIPIDEMIA, UNSPECIFIED: ICD-10-CM

## 2024-06-04 DIAGNOSIS — R73.03 PREDIABETES.: ICD-10-CM

## 2024-06-04 DIAGNOSIS — I10 ESSENTIAL (PRIMARY) HYPERTENSION: ICD-10-CM

## 2024-06-04 DIAGNOSIS — M25.562 PAIN IN LEFT KNEE: ICD-10-CM

## 2024-06-04 PROCEDURE — 99397 PER PM REEVAL EST PAT 65+ YR: CPT | Mod: 25

## 2024-06-04 NOTE — PHYSICAL EXAM
[Coordination Grossly Intact] : coordination grossly intact [No Focal Deficits] : no focal deficits [Normal Gait] : normal gait [Normal] : affect was normal and insight and judgment were intact [de-identified] : ecchymosis of left knee

## 2024-06-04 NOTE — HISTORY OF PRESENT ILLNESS
[FreeTextEntry1] : patient is here for her annual physical, patient states she is well and no complaints  [de-identified] : 72 y/o female with pmhx of dementia, hld, htn presents for cpe Patient is accompanied by her daughter. Patient has overall been doing well.   Patient had a fall when she was living in [place] She is still having pain in her left leg. She was using a steroid that was prescribed in her country. States all imaging of the leg was negative for fractures.

## 2024-06-04 NOTE — HEALTH RISK ASSESSMENT
[No] : In the past 12 months have you used drugs other than those required for medical reasons? No [Any fall with injury in past year] : Patient reported fall with injury in the past year [0] : 1) Little interest or pleasure doing things: Not at all (0) [1] : 2) Feeling down, depressed, or hopeless for several days (1) [PHQ-2 Negative - No further assessment needed] : PHQ-2 Negative - No further assessment needed [Former] : Former [With Family] : lives with family [Unemployed] : unemployed [Fully functional (bathing, dressing, toileting, transferring, walking, feeding)] : Fully functional (bathing, dressing, toileting, transferring, walking, feeding) [Some assistance needed] : managing finances [[id number]] : 1 [de-identified] : quit over 10 years ago, smoked for 40 years at least, smoked pipe tobacco [Reports changes in vision] : Reports no changes in vision

## 2024-06-04 NOTE — ASSESSMENT
[FreeTextEntry1] : CPE: -will order CBC w/ diff, CMP, Lipid, TSH and HgbA1c, labs to be drawn in office -Breast cancer screening: due, will order mammogram -Colon Cancer screening: due for repeat colonoscopy with Dr. Santiago, patient to follow up with gi -Vaccinations: Pneumococcal - recommended, Tdap - recommended, Shingles  - recommended -patient expressed understanding of plan, all questions answered  S/p mechanical fall with left knee pain will send for xray of left knee recommend ibuprofen 400mg every 4-6 hours as needed or tylenol 1000mg bid twice daily as needed recommend ice or heat follow up if no improvement  Dementia continue to follow with neuro continue memantine 10mg bid  HTN bp elevated at todays visit, states she took her medication only about 30 minutes ago will continue losartan 100mg daily, labetalol 200mg bid, and hydralazine 100mg bid  HLD continue simvastatin 20mg daily

## 2024-06-06 LAB
ALBUMIN SERPL ELPH-MCNC: 3.2 G/DL
ALP BLD-CCNC: 70 U/L
ALT SERPL-CCNC: 17 U/L
ANION GAP SERPL CALC-SCNC: 10 MMOL/L
AST SERPL-CCNC: 20 U/L
BASOPHILS # BLD AUTO: 0.01 K/UL
BASOPHILS NFR BLD AUTO: 0.1 %
BILIRUB SERPL-MCNC: 0.2 MG/DL
BUN SERPL-MCNC: 31 MG/DL
CALCIUM SERPL-MCNC: 9.3 MG/DL
CHLORIDE SERPL-SCNC: 103 MMOL/L
CHOLEST SERPL-MCNC: 214 MG/DL
CO2 SERPL-SCNC: 27 MMOL/L
CREAT SERPL-MCNC: 1.21 MG/DL
EGFR: 47 ML/MIN/1.73M2
EOSINOPHIL # BLD AUTO: 0.01 K/UL
EOSINOPHIL NFR BLD AUTO: 0.1 %
ESTIMATED AVERAGE GLUCOSE: 151 MG/DL
GLUCOSE SERPL-MCNC: 155 MG/DL
HBA1C MFR BLD HPLC: 6.9 %
HCT VFR BLD CALC: 32.6 %
HDLC SERPL-MCNC: 77 MG/DL
HGB BLD-MCNC: 10.5 G/DL
IMM GRANULOCYTES NFR BLD AUTO: 0.8 %
LDLC SERPL CALC-MCNC: 108 MG/DL
LYMPHOCYTES # BLD AUTO: 1.55 K/UL
LYMPHOCYTES NFR BLD AUTO: 14.6 %
MAN DIFF?: NORMAL
MCHC RBC-ENTMCNC: 31.3 PG
MCHC RBC-ENTMCNC: 32.2 GM/DL
MCV RBC AUTO: 97.3 FL
MONOCYTES # BLD AUTO: 0.46 K/UL
MONOCYTES NFR BLD AUTO: 4.3 %
NEUTROPHILS # BLD AUTO: 8.48 K/UL
NEUTROPHILS NFR BLD AUTO: 80.1 %
NONHDLC SERPL-MCNC: 138 MG/DL
PLATELET # BLD AUTO: 205 K/UL
POTASSIUM SERPL-SCNC: 4.8 MMOL/L
PROT SERPL-MCNC: 5.5 G/DL
RBC # BLD: 3.35 M/UL
RBC # FLD: 14.7 %
SODIUM SERPL-SCNC: 140 MMOL/L
TRIGL SERPL-MCNC: 174 MG/DL
TSH SERPL-ACNC: 2.03 UIU/ML
WBC # FLD AUTO: 10.59 K/UL

## 2024-06-06 RX ORDER — SIMVASTATIN 20 MG/1
20 TABLET, FILM COATED ORAL
Qty: 90 | Refills: 1 | Status: ACTIVE | COMMUNITY
Start: 2020-06-16 | End: 1900-01-01

## 2024-06-06 RX ORDER — HYDRALAZINE HYDROCHLORIDE 100 MG/1
100 TABLET ORAL
Qty: 90 | Refills: 1 | Status: ACTIVE | COMMUNITY
Start: 2022-07-21 | End: 1900-01-01

## 2024-06-06 RX ORDER — LOSARTAN POTASSIUM 100 MG/1
100 TABLET, FILM COATED ORAL
Qty: 90 | Refills: 1 | Status: ACTIVE | COMMUNITY
Start: 2019-12-16 | End: 1900-01-01

## 2024-06-06 RX ORDER — LABETALOL HYDROCHLORIDE 200 MG/1
200 TABLET, FILM COATED ORAL TWICE DAILY
Qty: 180 | Refills: 1 | Status: ACTIVE | COMMUNITY
Start: 2020-12-28

## 2024-06-06 RX ORDER — NORTRIPTYLINE HYDROCHLORIDE 25 MG/1
25 CAPSULE ORAL
Qty: 90 | Refills: 1 | Status: ACTIVE | COMMUNITY
Start: 2017-12-11 | End: 1900-01-01

## 2024-07-09 ENCOUNTER — APPOINTMENT (OUTPATIENT)
Dept: INTERNAL MEDICINE | Facility: CLINIC | Age: 74
End: 2024-07-09
Payer: MEDICAID

## 2024-07-09 VITALS
HEIGHT: 60 IN | BODY MASS INDEX: 32.64 KG/M2 | WEIGHT: 166.25 LBS | SYSTOLIC BLOOD PRESSURE: 123 MMHG | DIASTOLIC BLOOD PRESSURE: 55 MMHG | HEART RATE: 82 BPM

## 2024-07-09 VITALS — DIASTOLIC BLOOD PRESSURE: 52 MMHG | SYSTOLIC BLOOD PRESSURE: 102 MMHG

## 2024-07-09 DIAGNOSIS — M54.2 CERVICALGIA: ICD-10-CM

## 2024-07-09 DIAGNOSIS — K57.90 DIVERTICULOSIS OF INTESTINE, PART UNSPECIFIED, W/OUT PERFORATION OR ABSCESS W/OUT BLEEDING: ICD-10-CM

## 2024-07-09 DIAGNOSIS — R73.03 PREDIABETES.: ICD-10-CM

## 2024-07-09 DIAGNOSIS — I10 ESSENTIAL (PRIMARY) HYPERTENSION: ICD-10-CM

## 2024-07-09 DIAGNOSIS — R42 DIZZINESS AND GIDDINESS: ICD-10-CM

## 2024-07-09 PROCEDURE — 99214 OFFICE O/P EST MOD 30 MIN: CPT

## 2024-07-30 ENCOUNTER — APPOINTMENT (OUTPATIENT)
Dept: NEUROLOGY | Facility: CLINIC | Age: 74
End: 2024-07-30
Payer: MEDICAID

## 2024-07-30 VITALS
BODY MASS INDEX: 32.64 KG/M2 | HEIGHT: 60 IN | WEIGHT: 166.25 LBS | DIASTOLIC BLOOD PRESSURE: 68 MMHG | SYSTOLIC BLOOD PRESSURE: 135 MMHG

## 2024-07-30 DIAGNOSIS — F02.80 ALZHEIMER'S DISEASE, UNSPECIFIED: ICD-10-CM

## 2024-07-30 DIAGNOSIS — M54.2 CERVICALGIA: ICD-10-CM

## 2024-07-30 DIAGNOSIS — G30.9 ALZHEIMER'S DISEASE, UNSPECIFIED: ICD-10-CM

## 2024-07-30 DIAGNOSIS — R25.1 TREMOR, UNSPECIFIED: ICD-10-CM

## 2024-07-30 DIAGNOSIS — G44.89 OTHER HEADACHE SYNDROME: ICD-10-CM

## 2024-07-30 PROCEDURE — G2211 COMPLEX E/M VISIT ADD ON: CPT | Mod: NC

## 2024-07-30 PROCEDURE — 99214 OFFICE O/P EST MOD 30 MIN: CPT

## 2024-07-30 NOTE — ASSESSMENT
[FreeTextEntry1] : This is a 73-year-old woman with chronic headaches that have improved with nortriptyline. She is currently on 25 mg at bedtime. She will continue ibuprofen as needed.  She also has some cognitive impairment likely representing early Alzheimer's type dementia. I will continue Memantine 10 mg BID.  She has been having intermittent tremors predominantly of her head. These were not visible today. They do not interfere with her activities. She had side effects from primidone. This was discontinued. She is already on a beta-blocker.  I will refer her to physical therapy for the neck.  I will see her back in 6 months.

## 2024-07-30 NOTE — HISTORY OF PRESENT ILLNESS
[FreeTextEntry1] : I saw this patient in the office today.  As you recall she is a history of chronic headaches for many years. I had started her on nortriptyline.  She had initially reported good improvement. She had an increase in her headaches and increased nortriptyline back to 25 mg at bedtime.  She also has some cognitive impairment likely representing early Alzheimer's type dementia.  She had side effects on Aricept (palpitations). She has been on Namenda extended release 21 mg. She had side effects of 28 mg. She is now on generic Memantine 10 mg BID.   She has had some tremors intermittently. There have been no findings of parkinsonism, and dopamine spectroscopy scan was normal.  She reported that the tremors of her head are bothering her significantly. I had tried her on a low dose of primidone; however, she has had severe side effects of somnolence.  6/19/2023 visit: The headaches are now bothering her about twice per week. She uses ibuprofen as needed. The tremors do not bother her significantly at present.  1/16/2024 visit: She reports that the headaches only occur occasionally. Tremors are not bothering her significantly.  7/30/2024 visit: Headaches continue to occur occasionally. She now reports that neck pain is bothering her again.

## 2024-07-30 NOTE — DATA REVIEWED
[de-identified] : Brain MRI was performed on 2/23/13. \par  The study demonstrated only ischemic white matter change of a chronic nature. [de-identified] : EEG was normal. [de-identified] : Katya scan was normal.  [de-identified] : B12 level was normal, RPR was nonreactive.\par  Thyroid function was normal.\par  \par  ESR in January 2023 was 56.\par  C-reactive protein was less than 3.

## 2024-09-10 ENCOUNTER — APPOINTMENT (OUTPATIENT)
Dept: INTERNAL MEDICINE | Facility: CLINIC | Age: 74
End: 2024-09-10

## 2024-09-17 ENCOUNTER — APPOINTMENT (OUTPATIENT)
Dept: INTERNAL MEDICINE | Facility: CLINIC | Age: 74
End: 2024-09-17
Payer: MEDICAID

## 2024-09-17 VITALS
HEART RATE: 81 BPM | DIASTOLIC BLOOD PRESSURE: 59 MMHG | OXYGEN SATURATION: 96 % | HEIGHT: 60 IN | SYSTOLIC BLOOD PRESSURE: 110 MMHG | WEIGHT: 166 LBS | BODY MASS INDEX: 32.59 KG/M2

## 2024-09-17 DIAGNOSIS — D64.9 ANEMIA, UNSPECIFIED: ICD-10-CM

## 2024-09-17 DIAGNOSIS — I10 ESSENTIAL (PRIMARY) HYPERTENSION: ICD-10-CM

## 2024-09-17 DIAGNOSIS — R73.03 PREDIABETES.: ICD-10-CM

## 2024-09-17 DIAGNOSIS — E78.5 HYPERLIPIDEMIA, UNSPECIFIED: ICD-10-CM

## 2024-09-17 PROCEDURE — 99214 OFFICE O/P EST MOD 30 MIN: CPT | Mod: 25

## 2024-09-17 NOTE — HISTORY OF PRESENT ILLNESS
[de-identified] : 72 y/o female with pmhx of dementia, hld, htn presents for follow up. Patient is accompanied by her daughter.  Patient was seen by cardiology ([facility]) and started on furosemide due to swelling in the legs. States it didnt help at all so they stopped it. She is only losartan 50mg daily. She had carotid doppler which they state was negative. She was seen by her neurologist and will starting physical therapy for neck pain

## 2024-09-17 NOTE — ASSESSMENT
[FreeTextEntry1] : Dizziness - now imporved Likely related to borderline low blood pressures  HTN Continue losartan 50mg daily,  labetalol 200 mg twice daily and hydralazine 100 mg twice daily  Left lower quadrant abdominal pain - now resolved  Borderline diabetes Most recent hemoglobin A1c at 6.9, however patient had been on corticosteroids Will repeat hemoglobin A1c today  Anemia No active signs or symptoms of bleeding will check CBC with differential, CMP, iron with total iron binding capacity, ferritin, vitamin B12 and folate levels  HLD continue simvastatin 20mg daily

## 2024-09-23 LAB
ANION GAP SERPL CALC-SCNC: 13 MMOL/L
BASOPHILS # BLD AUTO: 0.05 K/UL
BASOPHILS NFR BLD AUTO: 0.8 %
BUN SERPL-MCNC: 21 MG/DL
CALCIUM SERPL-MCNC: 9.6 MG/DL
CHLORIDE SERPL-SCNC: 103 MMOL/L
CO2 SERPL-SCNC: 24 MMOL/L
CREAT SERPL-MCNC: 1.35 MG/DL
EGFR: 41 ML/MIN/1.73M2
EOSINOPHIL # BLD AUTO: 0.2 K/UL
EOSINOPHIL NFR BLD AUTO: 3 %
ESTIMATED AVERAGE GLUCOSE: 131 MG/DL
FERRITIN SERPL-MCNC: 102 NG/ML
FOLATE SERPL-MCNC: 5.1 NG/ML
GLUCOSE SERPL-MCNC: 112 MG/DL
HBA1C MFR BLD HPLC: 6.2 %
HCT VFR BLD CALC: 32.5 %
HGB BLD-MCNC: 10 G/DL
IMM GRANULOCYTES NFR BLD AUTO: 0.2 %
IRON SATN MFR SERPL: 28 %
IRON SERPL-MCNC: 71 UG/DL
LYMPHOCYTES # BLD AUTO: 1.56 K/UL
LYMPHOCYTES NFR BLD AUTO: 23.5 %
MAN DIFF?: NORMAL
MCHC RBC-ENTMCNC: 30.8 GM/DL
MCHC RBC-ENTMCNC: 31.3 PG
MCV RBC AUTO: 101.9 FL
MONOCYTES # BLD AUTO: 0.48 K/UL
MONOCYTES NFR BLD AUTO: 7.2 %
NEUTROPHILS # BLD AUTO: 4.35 K/UL
NEUTROPHILS NFR BLD AUTO: 65.3 %
PLATELET # BLD AUTO: 212 K/UL
POTASSIUM SERPL-SCNC: 4.3 MMOL/L
RBC # BLD: 3.19 M/UL
RBC # FLD: 13.4 %
SODIUM SERPL-SCNC: 140 MMOL/L
TIBC SERPL-MCNC: 257 UG/DL
UIBC SERPL-MCNC: 186 UG/DL
VIT B12 SERPL-MCNC: 691 PG/ML
WBC # FLD AUTO: 6.65 K/UL

## 2024-09-24 ENCOUNTER — NON-APPOINTMENT (OUTPATIENT)
Age: 74
End: 2024-09-24

## 2024-12-17 ENCOUNTER — APPOINTMENT (OUTPATIENT)
Dept: INTERNAL MEDICINE | Facility: CLINIC | Age: 74
End: 2024-12-17
Payer: MEDICAID

## 2024-12-17 VITALS
BODY MASS INDEX: 32.2 KG/M2 | HEIGHT: 60 IN | DIASTOLIC BLOOD PRESSURE: 60 MMHG | WEIGHT: 164 LBS | SYSTOLIC BLOOD PRESSURE: 132 MMHG

## 2024-12-17 DIAGNOSIS — R73.03 PREDIABETES.: ICD-10-CM

## 2024-12-17 DIAGNOSIS — G30.9 ALZHEIMER'S DISEASE, UNSPECIFIED: ICD-10-CM

## 2024-12-17 DIAGNOSIS — F02.80 ALZHEIMER'S DISEASE, UNSPECIFIED: ICD-10-CM

## 2024-12-17 DIAGNOSIS — G44.89 OTHER HEADACHE SYNDROME: ICD-10-CM

## 2024-12-17 DIAGNOSIS — M54.2 CERVICALGIA: ICD-10-CM

## 2024-12-17 DIAGNOSIS — I10 ESSENTIAL (PRIMARY) HYPERTENSION: ICD-10-CM

## 2024-12-17 DIAGNOSIS — E78.5 HYPERLIPIDEMIA, UNSPECIFIED: ICD-10-CM

## 2024-12-17 PROCEDURE — 90662 IIV NO PRSV INCREASED AG IM: CPT

## 2024-12-17 PROCEDURE — G0008: CPT

## 2024-12-17 PROCEDURE — 99214 OFFICE O/P EST MOD 30 MIN: CPT | Mod: 25

## 2024-12-23 LAB
ALBUMIN SERPL ELPH-MCNC: 3.7 G/DL
ALP BLD-CCNC: 81 U/L
ALT SERPL-CCNC: 13 U/L
ANION GAP SERPL CALC-SCNC: 12 MMOL/L
AST SERPL-CCNC: 20 U/L
BASOPHILS # BLD AUTO: 0.05 K/UL
BASOPHILS NFR BLD AUTO: 0.7 %
BILIRUB SERPL-MCNC: 0.2 MG/DL
BUN SERPL-MCNC: 25 MG/DL
CALCIUM SERPL-MCNC: 9.3 MG/DL
CHLORIDE SERPL-SCNC: 104 MMOL/L
CO2 SERPL-SCNC: 25 MMOL/L
CREAT SERPL-MCNC: 1.25 MG/DL
EGFR: 45 ML/MIN/1.73M2
EOSINOPHIL # BLD AUTO: 0.21 K/UL
EOSINOPHIL NFR BLD AUTO: 2.9 %
ESTIMATED AVERAGE GLUCOSE: 126 MG/DL
GLUCOSE SERPL-MCNC: 173 MG/DL
HBA1C MFR BLD HPLC: 6 %
HCT VFR BLD CALC: 32.5 %
HGB BLD-MCNC: 10.1 G/DL
IMM GRANULOCYTES NFR BLD AUTO: 0.3 %
LYMPHOCYTES # BLD AUTO: 1.77 K/UL
LYMPHOCYTES NFR BLD AUTO: 24.7 %
MAN DIFF?: NORMAL
MCHC RBC-ENTMCNC: 30.7 PG
MCHC RBC-ENTMCNC: 31.1 G/DL
MCV RBC AUTO: 98.8 FL
MONOCYTES # BLD AUTO: 0.4 K/UL
MONOCYTES NFR BLD AUTO: 5.6 %
NEUTROPHILS # BLD AUTO: 4.73 K/UL
NEUTROPHILS NFR BLD AUTO: 65.8 %
PLATELET # BLD AUTO: 214 K/UL
POTASSIUM SERPL-SCNC: 4.7 MMOL/L
PROT SERPL-MCNC: 6.3 G/DL
RBC # BLD: 3.29 M/UL
RBC # FLD: 13.5 %
SODIUM SERPL-SCNC: 141 MMOL/L
WBC # FLD AUTO: 7.18 K/UL

## 2025-01-06 ENCOUNTER — RX RENEWAL (OUTPATIENT)
Age: 75
End: 2025-01-06

## 2025-01-28 ENCOUNTER — APPOINTMENT (OUTPATIENT)
Dept: NEUROLOGY | Facility: CLINIC | Age: 75
End: 2025-01-28
Payer: MEDICAID

## 2025-01-28 VITALS
HEIGHT: 60 IN | BODY MASS INDEX: 32.2 KG/M2 | HEART RATE: 98 BPM | WEIGHT: 164 LBS | SYSTOLIC BLOOD PRESSURE: 109 MMHG | DIASTOLIC BLOOD PRESSURE: 68 MMHG

## 2025-01-28 DIAGNOSIS — G30.9 ALZHEIMER'S DISEASE, UNSPECIFIED: ICD-10-CM

## 2025-01-28 DIAGNOSIS — F02.80 ALZHEIMER'S DISEASE, UNSPECIFIED: ICD-10-CM

## 2025-01-28 DIAGNOSIS — R25.1 TREMOR, UNSPECIFIED: ICD-10-CM

## 2025-01-28 DIAGNOSIS — G44.89 OTHER HEADACHE SYNDROME: ICD-10-CM

## 2025-01-28 PROCEDURE — 99214 OFFICE O/P EST MOD 30 MIN: CPT

## 2025-01-28 PROCEDURE — G2211 COMPLEX E/M VISIT ADD ON: CPT | Mod: NC

## 2025-06-17 ENCOUNTER — APPOINTMENT (OUTPATIENT)
Dept: INTERNAL MEDICINE | Facility: CLINIC | Age: 75
End: 2025-06-17

## 2025-07-17 ENCOUNTER — RX RENEWAL (OUTPATIENT)
Age: 75
End: 2025-07-17